# Patient Record
Sex: FEMALE | Race: WHITE | NOT HISPANIC OR LATINO | Employment: OTHER | URBAN - METROPOLITAN AREA
[De-identification: names, ages, dates, MRNs, and addresses within clinical notes are randomized per-mention and may not be internally consistent; named-entity substitution may affect disease eponyms.]

---

## 2017-01-05 ENCOUNTER — HOSPITAL ENCOUNTER (OUTPATIENT)
Facility: HOSPITAL | Age: 77
Discharge: HOME | End: 2017-01-05
Attending: INTERNAL MEDICINE | Admitting: INTERNAL MEDICINE

## 2017-01-05 VITALS
BODY MASS INDEX: 31.33 KG/M2 | DIASTOLIC BLOOD PRESSURE: 52 MMHG | HEART RATE: 62 BPM | HEIGHT: 65 IN | OXYGEN SATURATION: 98 % | WEIGHT: 188.05 LBS | SYSTOLIC BLOOD PRESSURE: 110 MMHG | RESPIRATION RATE: 16 BRPM | TEMPERATURE: 97.5 F

## 2017-01-05 DIAGNOSIS — R07.9 CHEST PAIN, UNSPECIFIED TYPE: ICD-10-CM

## 2017-01-05 PROBLEM — E78.5 HYPERLIPIDEMIA LDL GOAL <70: Status: ACTIVE | Noted: 2017-01-05

## 2017-01-05 LAB
ALBUMIN SERPL-MCNC: 4.3 G/DL (ref 3.2–4.8)
ALBUMIN/GLOB SERPL: 1.3 G/DL (ref 1.5–2.5)
ALP SERPL-CCNC: 42 U/L (ref 25–100)
ALT SERPL W P-5'-P-CCNC: 14 U/L (ref 7–40)
ANION GAP SERPL CALCULATED.3IONS-SCNC: 8 MMOL/L (ref 3–11)
ARTICHOKE IGE QN: 59 MG/DL (ref 0–130)
AST SERPL-CCNC: 22 U/L (ref 0–33)
BILIRUB SERPL-MCNC: 0.4 MG/DL (ref 0.3–1.2)
BUN BLD-MCNC: 28 MG/DL (ref 9–23)
BUN/CREAT SERPL: 21.5 (ref 7–25)
CALCIUM SPEC-SCNC: 10.4 MG/DL (ref 8.7–10.4)
CHLORIDE SERPL-SCNC: 104 MMOL/L (ref 99–109)
CHOLEST SERPL-MCNC: 154 MG/DL (ref 0–200)
CO2 SERPL-SCNC: 24 MMOL/L (ref 20–31)
CREAT BLD-MCNC: 1.3 MG/DL (ref 0.6–1.3)
DEPRECATED RDW RBC AUTO: 48 FL (ref 37–54)
ERYTHROCYTE [DISTWIDTH] IN BLOOD BY AUTOMATED COUNT: 13.9 % (ref 11.3–14.5)
GFR SERPL CREATININE-BSD FRML MDRD: 40 ML/MIN/1.73
GLOBULIN UR ELPH-MCNC: 3.3 GM/DL
GLUCOSE BLD-MCNC: 100 MG/DL (ref 70–100)
GLUCOSE BLDC GLUCOMTR-MCNC: 111 MG/DL (ref 70–130)
GLUCOSE BLDC GLUCOMTR-MCNC: 93 MG/DL (ref 70–130)
HBA1C MFR BLD: 6 % (ref 4.8–5.6)
HCT VFR BLD AUTO: 35.5 % (ref 34.5–44)
HDLC SERPL-MCNC: 69 MG/DL (ref 40–60)
HGB BLD-MCNC: 11.6 G/DL (ref 11.5–15.5)
MCH RBC QN AUTO: 30.9 PG (ref 27–31)
MCHC RBC AUTO-ENTMCNC: 32.7 G/DL (ref 32–36)
MCV RBC AUTO: 94.7 FL (ref 80–99)
PLATELET # BLD AUTO: 144 10*3/MM3 (ref 150–450)
PMV BLD AUTO: 9.7 FL (ref 6–12)
POTASSIUM BLD-SCNC: 4.2 MMOL/L (ref 3.5–5.5)
PROT SERPL-MCNC: 7.6 G/DL (ref 5.7–8.2)
RBC # BLD AUTO: 3.75 10*6/MM3 (ref 3.89–5.14)
SODIUM BLD-SCNC: 136 MMOL/L (ref 132–146)
TRIGL SERPL-MCNC: 83 MG/DL (ref 0–150)
WBC NRBC COR # BLD: 5.82 10*3/MM3 (ref 3.5–10.8)

## 2017-01-05 PROCEDURE — 80061 LIPID PANEL: CPT | Performed by: PHYSICIAN ASSISTANT

## 2017-01-05 PROCEDURE — G0378 HOSPITAL OBSERVATION PER HR: HCPCS

## 2017-01-05 PROCEDURE — C1769 GUIDE WIRE: HCPCS | Performed by: INTERNAL MEDICINE

## 2017-01-05 PROCEDURE — 36415 COLL VENOUS BLD VENIPUNCTURE: CPT

## 2017-01-05 PROCEDURE — C1760 CLOSURE DEV, VASC: HCPCS | Performed by: INTERNAL MEDICINE

## 2017-01-05 PROCEDURE — 83036 HEMOGLOBIN GLYCOSYLATED A1C: CPT | Performed by: PHYSICIAN ASSISTANT

## 2017-01-05 PROCEDURE — C1894 INTRO/SHEATH, NON-LASER: HCPCS | Performed by: INTERNAL MEDICINE

## 2017-01-05 PROCEDURE — 80053 COMPREHEN METABOLIC PANEL: CPT | Performed by: PHYSICIAN ASSISTANT

## 2017-01-05 PROCEDURE — 0 IOPAMIDOL PER 1 ML: Performed by: INTERNAL MEDICINE

## 2017-01-05 PROCEDURE — 93458 L HRT ARTERY/VENTRICLE ANGIO: CPT | Performed by: INTERNAL MEDICINE

## 2017-01-05 PROCEDURE — 82962 GLUCOSE BLOOD TEST: CPT

## 2017-01-05 PROCEDURE — 85027 COMPLETE CBC AUTOMATED: CPT | Performed by: PHYSICIAN ASSISTANT

## 2017-01-05 RX ORDER — ASPIRIN 81 MG/1
81 TABLET ORAL DAILY
COMMUNITY
End: 2020-09-30

## 2017-01-05 RX ORDER — METOCLOPRAMIDE HYDROCHLORIDE 5 MG/ML
10 INJECTION INTRAMUSCULAR; INTRAVENOUS EVERY 6 HOURS PRN
Status: DISCONTINUED | OUTPATIENT
Start: 2017-01-05 | End: 2017-01-05 | Stop reason: HOSPADM

## 2017-01-05 RX ORDER — ASPIRIN 325 MG
325 TABLET ORAL ONCE
Status: COMPLETED | OUTPATIENT
Start: 2017-01-05 | End: 2017-01-05

## 2017-01-05 RX ORDER — CLOPIDOGREL BISULFATE 75 MG/1
75 TABLET ORAL ONCE
Status: DISCONTINUED | OUTPATIENT
Start: 2017-01-05 | End: 2017-01-05 | Stop reason: HOSPADM

## 2017-01-05 RX ORDER — LIDOCAINE HYDROCHLORIDE 10 MG/ML
INJECTION, SOLUTION INFILTRATION; PERINEURAL AS NEEDED
Status: DISCONTINUED | OUTPATIENT
Start: 2017-01-05 | End: 2017-01-05 | Stop reason: HOSPADM

## 2017-01-05 RX ORDER — SODIUM CHLORIDE 9 MG/ML
1-3 INJECTION, SOLUTION INTRAVENOUS CONTINUOUS
Status: DISCONTINUED | OUTPATIENT
Start: 2017-01-05 | End: 2017-01-05 | Stop reason: HOSPADM

## 2017-01-05 RX ORDER — SODIUM CHLORIDE 0.9 % (FLUSH) 0.9 %
1-10 SYRINGE (ML) INJECTION AS NEEDED
Status: DISCONTINUED | OUTPATIENT
Start: 2017-01-05 | End: 2017-01-05 | Stop reason: HOSPADM

## 2017-01-05 RX ORDER — ACETAMINOPHEN 325 MG/1
650 TABLET ORAL EVERY 4 HOURS PRN
Status: DISCONTINUED | OUTPATIENT
Start: 2017-01-05 | End: 2017-01-05 | Stop reason: HOSPADM

## 2017-01-05 RX ORDER — POTASSIUM CHLORIDE 750 MG/1
10 TABLET, FILM COATED, EXTENDED RELEASE ORAL DAILY
COMMUNITY
End: 2018-12-13 | Stop reason: SDUPTHER

## 2017-01-05 RX ORDER — NITROGLYCERIN 0.4 MG/1
0.4 TABLET SUBLINGUAL
Status: DISCONTINUED | OUTPATIENT
Start: 2017-01-05 | End: 2017-01-05 | Stop reason: HOSPADM

## 2017-01-05 RX ORDER — ONDANSETRON 2 MG/ML
4 INJECTION INTRAMUSCULAR; INTRAVENOUS EVERY 6 HOURS PRN
Status: DISCONTINUED | OUTPATIENT
Start: 2017-01-05 | End: 2017-01-05 | Stop reason: HOSPADM

## 2017-01-05 RX ORDER — CLOPIDOGREL BISULFATE 75 MG/1
75 TABLET ORAL DAILY
Status: DISCONTINUED | OUTPATIENT
Start: 2017-01-06 | End: 2017-01-05 | Stop reason: HOSPADM

## 2017-01-05 RX ORDER — SODIUM CHLORIDE 9 MG/ML
100 INJECTION, SOLUTION INTRAVENOUS CONTINUOUS
Status: ACTIVE | OUTPATIENT
Start: 2017-01-05 | End: 2017-01-05

## 2017-01-05 RX ORDER — ASPIRIN 325 MG
325 TABLET, DELAYED RELEASE (ENTERIC COATED) ORAL DAILY
Status: DISCONTINUED | OUTPATIENT
Start: 2017-01-06 | End: 2017-01-05 | Stop reason: HOSPADM

## 2017-01-05 RX ADMIN — ASPIRIN 325 MG ORAL TABLET 325 MG: 325 PILL ORAL at 07:59

## 2017-01-05 RX ADMIN — SODIUM CHLORIDE 100 ML/HR: 9 INJECTION, SOLUTION INTRAVENOUS at 09:16

## 2017-01-05 RX ADMIN — SODIUM CHLORIDE 3 ML/KG/HR: 9 INJECTION, SOLUTION INTRAVENOUS at 08:00

## 2017-01-05 NOTE — IP AVS SNAPSHOT
AFTER VISIT SUMMARY             Lino Guerrero           About your hospitalization     You were admitted on:  January 5, 2017 You last received care in the:  [facility]       Procedures & Surgeries      Procedure(s) (LRB):  Left Heart Cath (N/A)     1/5/2017     Surgeon(s):  Vic Marks MD  -------------------      Medications    If you or your caregiver advised us that you are currently taking a medication and that medication is marked below as “Resume”, this simply indicates that we have reviewed those medications to make sure our new therapy recommendations do not interfere.  If you have concerns about medications other than those new ones which we are prescribing today, please consult the physician who prescribed them (or your primary physician).  Our review of your home medications is not meant to indicate that we are directing their use.             Your Medications      CHANGE how you take these medications     lisinopril 20 MG tablet   Take 1 tablet by mouth Daily.   According to our records, you may have been taking this medication differently.   Commonly known as:  MITZI GOMEZRIL   What changed:  how much to take             CONTINUE taking these medications     amLODIPine 5 MG tablet   Take 1 tablet by mouth Daily.   Commonly known as:  NORVASC           aspirin 81 MG EC tablet   Take 81 mg by mouth Daily.           atorvastatin 20 MG tablet   Take 40 mg by mouth Daily.   Commonly known as:  LIPITOR           BYSTOLIC 5 MG tablet   TAKE 1 TABLET DAILY   Generic drug:  nebivolol           clopidogrel 75 MG tablet   TAKE ONE TABLET BY MOUTH EVERY DAY   Commonly known as:  PLAVIX           DULERA IN   Inhale 1 puff Daily As Needed.           furosemide 20 MG tablet   TAKE 1 TABLET BY MOUTH EVERY MORNING AND 1/2 TABLET AT BEDTIME   Commonly known as:  LASIX           isosorbide mononitrate 30 MG 24 hr tablet   Take 1 tablet by mouth Every Morning.   Commonly known as:  IMDUR           levothyroxine 75 MCG tablet   Take 75 mcg by mouth Daily.   Commonly known as:  SYNTHROID, LEVOTHROID           metFORMIN 500 MG tablet   Take 1,000 mg by mouth 2 (Two) Times a Day With Meals.   Commonly known as:  GLUCOPHAGE           NITROSTAT 0.4 MG SL tablet   place 1 tablet under the tongue every 5 minutes for UP TO 3 doses if needed for angina as directed by prescriber   Generic drug:  nitroglycerin           potassium chloride 10 MEQ CR tablet   Take 10 mEq by mouth Daily.   Commonly known as:  K-DUR           sucralfate 1 G tablet   Take 1 g by mouth 3 (Three) Times a Day.   Commonly known as:  CARAFATE                      Your Medications      Your Medication List           Morning Noon Evening Bedtime As Needed    amLODIPine 5 MG tablet   Take 1 tablet by mouth Daily.   Commonly known as:  NORVASC                                aspirin 81 MG EC tablet   Take 81 mg by mouth Daily.                                atorvastatin 20 MG tablet   Take 40 mg by mouth Daily.   Commonly known as:  LIPITOR                                BYSTOLIC 5 MG tablet   TAKE 1 TABLET DAILY   Generic drug:  nebivolol                                clopidogrel 75 MG tablet   TAKE ONE TABLET BY MOUTH EVERY DAY   Commonly known as:  PLAVIX                                DULERA IN   Inhale 1 puff Daily As Needed.                                furosemide 20 MG tablet   TAKE 1 TABLET BY MOUTH EVERY MORNING AND 1/2 TABLET AT BEDTIME   Commonly known as:  LASIX                                isosorbide mononitrate 30 MG 24 hr tablet   Take 1 tablet by mouth Every Morning.   Commonly known as:  IMDUR                                levothyroxine 75 MCG tablet   Take 75 mcg by mouth Daily.   Commonly known as:  SYNTHROID, LEVOTHROID                                lisinopril 20 MG tablet   Take 1 tablet by mouth Daily.   Commonly known as:  PRINIVIL,ZESTRIL                                metFORMIN 500 MG tablet   Take 1,000 mg by mouth 2  (Two) Times a Day With Meals.   Commonly known as:  GLUCOPHAGE                                NITROSTAT 0.4 MG SL tablet   place 1 tablet under the tongue every 5 minutes for UP TO 3 doses if needed for angina as directed by prescriber   Generic drug:  nitroglycerin                                potassium chloride 10 MEQ CR tablet   Take 10 mEq by mouth Daily.   Commonly known as:  K-DUR                                sucralfate 1 G tablet   Take 1 g by mouth 3 (Three) Times a Day.   Commonly known as:  CARAFATE                                         Instructions for After Discharge        Discharge References/Attachments     ANGINA PECTORIS (ENGLISH)    CORONARY ANGIOGRAM (ENGLISH)    GROIN SURGICAL SITE CARE (ENGLISH)       Follow-ups for After Discharge        Follow-up Information     Follow up with Alexy Blake MD Follow up in 4 week(s).    Specialty:  Cardiology    Contact information:    [facility] [place] [id number] [phone]        VODECLIC Signup     [employer] allows you to send messages to your doctor, view your test results, renew your prescriptions, schedule appointments, and more. To sign up, go to [employer] and click on the Sign Up Now link in the New User? box. Enter your [employer] Activation Code exactly as it appears below along with the last four digits of your Social Security Number and your Date of Birth () to complete the sign-up process. If you do not sign up before the expiration date, you must request a new code.    [employer] Activation Code: [email or web address]  Expires: 2017 12:40 PM    If you have questions, you can email [email or web address] or call [phone] to talk to our VODECL[employer] staff. Remember, [employer] is NOT to be used for urgent needs. For medical emergencies, dial 911.           Summary of Your Hospitalization        Reason for Hospitalization     Your primary diagnosis was:  Coronary Artery Disease Involving  Native Coronary Artery Of Native Heart With Unstable Angina Pectoris    Your diagnoses also included:  Atrial Fibrillation (Irregular Heartbeat), Severe Hypertension, Diabetes Mellitus Type 2, Noninsulin Dependent, Pvd (Peripheral Vascular Disease), Hyperlipidemia Ldl Goal <70, Underactive Thyroid, Chest Pain      Care Providers     Provider Service Role Specialty    Vic Marks MD Cardiology Attending Provider Cardiology      Your Allergies  Date Reviewed: 1/5/2017    No active allergies      Pending Labs     Order Current Status    Hemoglobin A1c In process      Patient Belongings Returned     Document Return of Belongings Flowsheet     Were the patient bedside belongings sent home?   --   Belongings Retrieved from Security & Sent Home   --    Belongings Sent to Safe   --   Medications Retrieved from Pharmacy & Sent Home   --              More Information      Angina Pectoris  Angina pectoris, often called angina, is extreme discomfort in the chest, neck, or arm. This is caused by a lack of blood in the middle and thickest layer of the heart wall (myocardium). There are four types of angina:  · Stable angina. Stable angina usually occurs in episodes of predictable frequency and duration. It is usually brought on by physical activity, stress, or excitement. Stable angina usually lasts a few minutes and can often be relieved by a medicine that you place under your tongue. This medicine is called sublingual nitroglycerin.  · Unstable angina. Unstable angina can occur even when you are doing little or no physical activity. It can even occur while you are sleeping or when you are at rest. It can suddenly increase in severity or frequency. It may not be relieved by sublingual nitroglycerin, and it can last up to 30 minutes.  · Microvascular angina. This type of angina is caused by a disorder of tiny blood vessels called arterioles. Microvascular angina is more common in women. The pain may be more severe and last  longer than other types of angina pectoris.  · Prinzmetal or variant angina. This type of angina pectoris is rare and usually occurs when you are doing little or no physical activity. It especially occurs in the early morning hours.  CAUSES  Atherosclerosis is the cause of angina. This is the buildup of fat and cholesterol (plaque) on the inside of the arteries. Over time, the plaque may narrow or block the artery, and this will lessen blood flow to the heart. Plaque can also become weak and break off within a coronary artery to form a clot and cause a sudden blockage.  RISK FACTORS  Risk factors common to both men and women include:  · High cholesterol levels.  · High blood pressure (hypertension).  · Tobacco use.  · Diabetes.  · Family history of angina.  · Obesity.  · Lack of exercise.  · A diet high in saturated fats.  Women are at greater risk for angina if they are:  · Over age 55.  · Postmenopausal.  SYMPTOMS  Many people do not experience any symptoms during the early stages of angina. As the condition progresses, symptoms common to both men and women may include:  · Chest pain.    The pain can be described as a crushing or squeezing in the chest, or a tightness, pressure, fullness, or heaviness in the chest.    The pain can last more than a few minutes, or it can stop and recur.  · Pain in the arms, neck, jaw, or back.  · Unexplained heartburn or indigestion.  · Shortness of breath.  · Nausea.  · Sudden cold sweats.  · Sudden light-headedness.  Many women have chest discomfort and some of the other symptoms. However, women often have different (atypical) symptoms, such as:   · Fatigue.  · Unexplained feelings of nervousness or anxiety.  · Unexplained weakness.  · Dizziness or fainting.  Sometimes, women may have angina without any symptoms.  DIAGNOSIS   Tests to diagnose angina may include:  · ECG (electrocardiogram).  · Exercise stress test. This looks for signs of blockage when the heart is being  exercised.  · Pharmacologic stress test. This test looks for signs of blockage when the heart is being stressed with a medicine.  · Blood tests.  · Coronary angiogram. This is a procedure to look at the coronary arteries to see if there is any blockage.  TREATMENT   The treatment of angina may include the following:  · Healthy behavioral changes to reduce or control risk factors.  · Medicine.  · Coronary stenting. A stent helps to keep an artery open.  · Coronary angioplasty. This procedure widens a narrowed or blocked artery.  · Coronary artery bypass surgery. This will allow your blood to pass the blockage (bypass) to reach your heart.  HOME CARE INSTRUCTIONS   · Take medicines only as directed by your health care provider.  · Do not take the following medicines unless your health care provider approves:    Nonsteroidal anti-inflammatory drugs (NSAIDs), such as ibuprofen, naproxen, or celecoxib.    Vitamin supplements that contain vitamin A, vitamin E, or both.    Hormone replacement therapy that contains estrogen with or without progestin.  · Manage other health conditions such as hypertension and diabetes as directed by your health care provider.  · Follow a heart-healthy diet. A dietitian can help to educate you about healthy food options and changes.  · Use healthy cooking methods such as roasting, grilling, broiling, baking, poaching, steaming, or stir-frying. Talk to a dietitian to learn more about healthy cooking methods.  · Follow an exercise program approved by your health care provider.  · Maintain a healthy weight. Lose weight as approved by your health care provider.  · Plan rest periods when fatigued.  · Learn to manage stress.  · Do not use any tobacco products, including cigarettes, chewing tobacco, or electronic cigarettes. If you need help quitting, ask your health care provider.  · If you drink alcohol, and your health care provider approves, limit your alcohol intake to no more than 1 drink per  day. One drink equals 12 ounces of beer, 5 ounces of wine, or 1½ ounces of hard liquor.  · Stop illegal drug use.  · Keep all follow-up visits as directed by your health care provider. This is important.  SEEK IMMEDIATE MEDICAL CARE IF:   · You have pain in your chest, neck, arm, jaw, stomach, or back that lasts more than a few minutes, is recurring, or is unrelieved by taking sublingual nitroglycerin.  · You have profuse sweating without cause.  · You have unexplained:    Heartburn or indigestion.    Shortness of breath or difficulty breathing.    Nausea or vomiting.    Fatigue.    Feelings of nervousness or anxiety.    Weakness.    Diarrhea.  · You have sudden light-headedness or dizziness.  · You faint.  These symptoms may represent a serious problem that is an emergency. Do not wait to see if the symptoms will go away. Get medical help right away. Call your local emergency services (911 in the [place] Do not drive yourself to the hospital.     This information is not intended to replace advice given to you by your health care provider. Make sure you discuss any questions you have with your health care provider.     Document Released: 12/18/2006 Document Revised: 01/08/2016 Document Reviewed: 04/21/2015  [employer] Interactive Patient Education ©2016 [employer].          Coronary Angiogram  A coronary angiogram, also called coronary angiography, is an X-ray procedure used to look at the arteries in the heart. In this procedure, a dye (contrast dye) is injected through a long, hollow tube (catheter). The catheter is about the size of a piece of cooked spaghetti and is inserted through your groin, wrist, or arm. The dye is injected into each artery, and X-rays are then taken to show if there is a blockage in the arteries of your heart.  LET YOUR HEALTH CARE PROVIDER KNOW ABOUT:  · Any allergies you have, including allergies to shellfish or contrast dye.    · All medicines you are taking, including vitamins, herbs,  eye drops, creams, and over-the-counter medicines.    · Previous problems you or members of your family have had with the use of anesthetics.    · Any blood disorders you have.    · Previous surgeries you have had.  · History of kidney problems or failure.    · Other medical conditions you have.  RISKS AND COMPLICATIONS   Generally, a coronary angiogram is a safe procedure. However, problems can occur and include:  · Allergic reaction to the dye.  · Bleeding from the access site or other locations.  · Kidney injury, especially in people with impaired kidney function.   · Stroke (rare).  · Heart attack (rare).  BEFORE THE PROCEDURE   · Do not eat or drink anything after midnight the night before the procedure or as directed by your health care provider.    · Ask your health care provider about changing or stopping your regular medicines. This is especially important if you are taking diabetes medicines or blood thinners.  PROCEDURE  · You may be given a medicine to help you relax (sedative) before the procedure. This medicine is given through an intravenous (IV) access tube that is inserted into one of your veins.    · The area where the catheter will be inserted will be washed and shaved. This is usually done in the groin but may be done in the fold of your arm (near your elbow) or in the wrist.     · A medicine will be given to numb the area where the catheter will be inserted (local anesthetic).    · The health care provider will insert the catheter into an artery. The catheter will be guided by using a special type of X-ray (fluoroscopy) of the blood vessel being examined.    · A special dye will then be injected into the catheter, and X-rays will be taken. The dye will help to show where any narrowing or blockages are located in the heart arteries.    AFTER THE PROCEDURE   · If the procedure is done through the leg, you will be kept in bed lying flat for several hours. You will be instructed to not bend or cross  your legs.  · The insertion site will be checked frequently.    · The pulse in your feet or wrist will be checked frequently.    · Additional blood tests, X-rays, and an electrocardiogram may be done.       This information is not intended to replace advice given to you by your health care provider. Make sure you discuss any questions you have with your health care provider.     Document Released: 06/23/2004 Document Revised: 01/08/2016 Document Reviewed: 05/12/2014  [employer] Interactive Patient Education ©2016 [employer]          Groin Surgical Site Care  Refer to this sheet in the next few weeks. These instructions provide you with information about caring for yourself after your procedure. Your health care provider may also give you more specific instructions. Your treatment has been planned according to current medical practices, but problems sometimes occur. Call your health care provider if you have any problems or questions after your procedure.  WHAT TO EXPECT AFTER THE PROCEDURE  After your procedure, it is typical to have the following:  · Bruising at the groin site that usually fades within 1-2 weeks.  · Blood collecting in the tissue (hematoma) that may be painful to the touch. It should usually decrease in size and tenderness within 1-2 weeks.  HOME CARE INSTRUCTIONS  · Take medicines only as directed by your health care provider.  · You may shower 24-48 hours after the procedure or as directed by your health care provider. Remove the bandage (dressing) and gently wash the site with plain soap and water. Pat the area dry with a clean towel. Do not rub the site, because this may cause bleeding.  · Do not take baths, swim, or use a hot tub until your health care provider approves.  · Check your insertion site every day for redness, swelling, or drainage.  · Do not apply powder or lotion to the site.  · Limit use of stairs to twice a day for the first 2-3 days or as directed by your health care  provider.  · Do not squat for the first 2-3 days or as directed by your health care provider.  · Do not lift over 10 lb (4.5 kg) for 5 days after your procedure or as directed by your health care provider.  · Ask your health care provider when it is okay to:    Return to work or school.    Resume usual physical activities or sports.    Resume sexual activity.  · Do not drive home if you are discharged the same day as the procedure. Have someone else drive you.  · You may drive 24 hours after the procedure unless otherwise instructed by your health care provider.  · Do not operate machinery or power tools for 24 hours after the procedure or as directed by your health care provider.  · If your procedure was done as an outpatient procedure, which means that you went home the same day as your procedure, a responsible adult should be with you for the first 24 hours after you arrive home.  · Keep all follow-up visits as directed by your health care provider. This is important.  SEEK MEDICAL CARE IF:  · You have a fever.  · You have chills.  · You have increased bleeding from the groin site. Hold pressure on the site.  SEEK IMMEDIATE MEDICAL CARE IF:  · You have unusual pain at the groin site.  · You have redness, warmth, or swelling at the groin site.  · You have drainage (other than a small amount of blood on the dressing) from the groin site.  · The groin site is bleeding, and the bleeding does not stop after 30 minutes of holding steady pressure on the site.  · Your leg or foot becomes pale, cool, tingly, or numb.     This information is not intended to replace advice given to you by your health care provider. Make sure you discuss any questions you have with your health care provider.     Document Released: 08/21/2015 Document Reviewed: 08/21/2015  [employer] Interactive Patient Education ©2016 [employer]            SYMPTOMS OF A STROKE    Call 911 or have someone take you to the Emergency Department if you have any  of the following:    · Sudden numbness or weakness of your face, arm or leg especially on one side of the body  · Sudden confusion, diffiiculty speaking or trouble understanding   · Changes in your vision or loss of sight in one eye  · Sudden severe headache with no known cause  · sudden dizziness, trouble walking, loss of balance or coordination    It is important to seek emergency care right away if you have further stroke symptoms. If you get emergency help quickly, the powerful clot-dissolving medicines can reduce the disabilities caused by a stroke.     For more information:    American Stroke Association  9-173-3-STROKE  www.strokeassociation.org           IF YOU SMOKE OR USE TOBACCO PLEASE READ THE FOLLOWING:    Why is smoking bad for me?  Smoking increases the risk of heart disease, lung disease, vascular disease, stroke, and cancer.     If you smoke, STOP!    If you would like more information on quitting smoking, please visit the [employer] website: [email or web address]   This link will provide additional resources including the QUIT line and the Beat the Pack support groups.     For more information:    American Cancer Society  (435) 580-6391    American Heart Association  1-413.826.9526               YOU ARE THE MOST IMPORTANT FACTOR IN YOUR RECOVERY.     Follow all instructions carefully.     I have reviewed my discharge instructions with my nurse, including the following information, if applicable:     Information about my illness and diagnosis   Follow up appointments (including lab draws)   Wound Care   Equipment Needs   Medications (new and continuing) along with side effects   Preventative information such as vaccines and smoking cessations   Diet   Pain   I know when to contact my Doctor's office or seek emergency care      I want my nurse to describe the side effects of my medications: YES NO   If the answer is no, I understand the side effects of  my medications: YES NO   My nurse described the side effects of my medications in a way that I could understand: YES NO   I have taken my personal belongings and my own medications with me at discharge: YES NO            I have received this information and my questions have been answered. I have discussed any concerns I see with this plan with the nurse or physician. I understand these instructions.    Signature of Patient or Responsible Person: _____________________________________    Date: _________________  Time: __________________    Signature of Healthcare Provider: _______________________________________  Date: _________________  Time: __________________

## 2017-01-05 NOTE — PLAN OF CARE
Problem: Patient Care Overview (Adult)  Goal: Plan of Care Review  Outcome: Ongoing (interventions implemented as appropriate)    01/05/17 1333   Coping/Psychosocial Response Interventions   Plan Of Care Reviewed With patient;family   Patient Care Overview   Progress improving   Outcome Evaluation   Outcome Summary/Follow up Plan PT AND FAMILY ABLE TO VERBALIZE UNDERSTANDING OF DC INSTRUCTIONS- NO QUESTIONS AT THIS TIME- SITE STABLE AND AMBULATING WITH NO ISSUES.       Goal: Discharge Needs Assessment  Outcome: Ongoing (interventions implemented as appropriate)    01/05/17 1333   Discharge Needs Assessment   Concerns To Be Addressed no discharge needs identified         Problem: Cardiac Catheterization with/without PCI (Adult)  Goal: Signs and Symptoms of Listed Potential Problems Will be Absent or Manageable (Cardiac Catheterization with/without PCI)  Outcome: Ongoing (interventions implemented as appropriate)    01/05/17 1333   Cardiac Catheterization with/without PCI   Problems Assessed (Cardiac Catheterization) all   Problems Present (Cardiac Catheterization) none

## 2017-01-05 NOTE — INTERVAL H&P NOTE
H&P reviewed. The patient was examined and there are no changes to the H&P.    Mrs. Huynh is a patient of our colleague Dr Alexy Blake who has known non obstructive coronary artery disease of 50% in the 1st diagonal as well as the LAD by catheterization in 2014 and is being referred for a cardiac catheterization due to ongoing chest pain, dyspnea and dizziness as reported at her last appointment with him 2 weeks ago. She was started on Imdur 30 mg at that time but is still experiencing progressive exertional angina of which she has increased her use of SL NTG that does seem to help.  She is currently chest pain free. She has uncontrolled hypertension and at her last visit was started on amlodipine and her dose of lisinopril was increased and this seems to have improved her numbers.    Physical Exam:  Constitutional: AAO x 3, no acute distress  HEENT: No JVD, no carotid bruit noted  Lungs: CTA bilat, respirations even and unlabored  Cardiac: Normal S 1 and S 2, RRR, no murmurs, rubs or gallops  Abdomen: soft non tender, BS + x 4, no hepatosplenomegaly  Skin: Warm, pink and dry  Extremities: Pulses palpable bilat, normal left andres's  Psych: normal mood and affect  Musculoskeletal: no bony abnormalities    Lab Results   Component Value Date    BUN 28 (H) 01/05/2017     Lab Results   Component Value Date    CREATININE 1.30 01/05/2017     Lab Results   Component Value Date     01/05/2017    K 4.2 01/05/2017     01/05/2017    CO2 24.0 01/05/2017     Lab Results   Component Value Date    WBC 5.82 01/05/2017    HGB 11.6 01/05/2017    HCT 35.5 01/05/2017    MCV 94.7 01/05/2017     (L) 01/05/2017     Hospital Problem List     * (Principal)Coronary artery disease involving native coronary artery of native heart with unstable angina pectoris    Overview Addendum 1/5/2017  8:00 AM by SAMUEL Prado     · Remote borderline abnormal IV Adenosine Quantitative SPECT Thallium 201 study, April 1997.  Remote progressive CCS class III-IV chest pain syndrome with       mild nonobstructive coronary atherosclerosis and mild reduction in LV function (LVEF 0.52), May 2001,   · Acceptable combination Doppler echocardiogram with mild concentric LVH, LVEF (0.55), March 2006.   · Recent prolonged chest pain syndrome with observational stay ([facility]), June 2011, with subsequent diagnostic coronary angiography                           moderate branch vessel CAD with acceptable FFR. Normal LV function, LVEF (0.60),continued medical therapy felt warranted, July 2011.    · Residual CCS class I angina pectoris with recent progressive atypical chest pain syndrome/normal EKG, August 2012, January 2013, May 2013.    · Abnormal PET cardiac scan demonstrating small area of reversible LAD ischemia with prominent diffuse ischemic ST-T changes and acceptable preserved            systolic function,  06/04/2013, with patient refusing further intervention with diagnostic coronary angiography 06/26/2013.  · Echocardiogram showing an estimated EF of 45% to 50% with moderate tricuspid regurgitation and mild concentric left ventricular hypertrophy, 10/17/2014.   · NSTEMI with left heart catheterization showing a nonobstructive single vessel coronary artery disease 50% ostial stenosis of the first diagonal branch, LAD. No       hemodynamically significant CAD and estimated EF of 35% with features of Takotsubo cardiomyopathy, 10/15/2014, by Dr. Marks.   · Residual class I symptoms with acceptable echocardiogram, February 2016.         Severe hypertension    Overview Addendum 1/5/2017  8:02 AM by SAMUEL Prado     · Acceptable renal nuclear blood flow scan/renal ultrasound/abnormal acceptable abdominal CT scan, April 1997.   · Recurrent progressive hypertensive blood pressure readings/acceptable selective bilateral renal arteriography, May 2011.    · Started on amlodipine and lisinopril increased office visit 12/23/2016.          PAF (paroxysmal atrial fibrillation)    Overview Addendum 1/5/2017  7:55 AM by SAMUEL Prado     · Intermittent paroxysmal atrial fibrillation, (12/2009), with subsequent chronic oral anticoagulation x1 year.   · Juliocesar Vasc=6          Diabetes mellitus type 2, noninsulin dependent    Overview Signed 12/8/2016  4:00 PM by Adali Thomas      (hemoglobin A1c 5.8%), July 2011         Hyperlipidemia LDL goal <70    PVD (peripheral vascular disease)    Overview Addendum 1/5/2017  7:55 AM by SAMUEL Prado     · Asymptomatic bilateral carotid bruits with remote acceptable carotid duplex studies, April 1997.  · Recent abnormal carotid duplex study with moderate right internal carotid artery stenosis (40% to 60%) with mild left internal carotid artery stenosis (20% to 40%). 2011.    · Abnormal carotid duplex study with bilateral moderate carotid artery atherosclerotic plaque/stenoses, January 2015, without focal motor-sensory changes.             Hypothyroidism    Overview Signed 12/8/2016  4:04 PM by Adali Thomas     chronic, /replacement therapy:               Plan:    · Undergo Cardiac catheterization with Dr Marks via left radial approach: Risks and benefits were explained and patient is agreeable to proceed.  · Give full strength Asprin as pre op  · Hold metformin for 48 hrs after procedure  · More recommendations to follow post procedure    Loly BAKER    I have seen and examined the patient, case was discussed with the physician extender, reviewed the above note, necessary changes were made and I agree with the final note.   Vic Marks MD, [facility], [facility]

## 2017-01-05 NOTE — Clinical Note
Hemostasis started on the Artery.  Angio-Seal was used in achieving hemostasis.  Closure device deployed in the vessel. Hemostasis achieved successfully. .

## 2017-01-05 NOTE — H&P (VIEW-ONLY)
Subjective:     Encounter Date:12/23/2016      Patient ID: Lino Guerrero is a 76 y.o. 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    PHYSICIAN: James Mcfarlane DO   GASTROENTEROLOGIST: Tanner Rdz MD   ENDOCRINOLOGIST: Antonio Barrera MD   INTERVENTIONAL CARDIOLOGIST:  Vic Marks MD, [facility]    Chief Complaint:   Chief Complaint   Patient presents with   • Follow-up     htn, paf, pvd     Problem List:  1. Remote progressive severe hypertension, 1997:  a. Acceptable renal nuclear blood flow scan/renal ultrasound/abnormal acceptable abdominal CT scan, April 1997.   b. Recurrent progressive hypertensive blood pressure readings/acceptable selective bilateral renal arteriography, May 2011.   2. Intermittent paroxysmal atrial fibrillation, December 2009, with subsequent chronic oral anticoagulation x1 year.   3. Noninsulin dependent type 2 diabetes mellitus (hemoglobin A1c 5.8%), July 2011.   4. Remote hiatal hernia/probable GERD syndrome with remote EGD, April 1997.   5. Remote peptic ulcer disease with recurrent intermittent gastritis.   6. Osteoarthritis, predominantly involving the knees.   7. Peripheral vascular disease:  a. Asymptomatic bilateral carotid bruits with remote acceptable carotid duplex studies, April 1997.  b. Recent abnormal carotid duplex study with moderate right internal carotid artery stenosis (40% to 60%) with mild left internal carotid artery stenosis (20% to 40%), July 2011.   c. Abnormal carotid duplex study with bilateral moderate carotid artery atherosclerotic plaque/stenoses, January 2015, without focal motor-sensory changes.   8. Remote additional operations:  a. Bilateral benign breast cyst removal, 1985.   b. 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   c. Remote colon polypectomy with incidental findings of diverticulosis, August 1995.   9. Chronic hypothyroidism/replacement therapy:   10. Chronic chest pain syndrome with combined hypertensive and ischemic  cardiovascular disease:  a. Intermittent exertional dyspnea/chest pain syndrome/probable hypertensive cardiovascular disease:  b. Remote borderline abnormal IV Adenosine Quantitative SPECT Thallium 201 study, April 1997. Remote progressive CCS class III-IV chest pain syndrome with mild nonobstructive coronary atherosclerosis and mild reduction in LV function (LVEF 0.52), May 2001, with subsequent abnormal EGD demonstrating gastritis/treatment with resolution of symptoms.   c. Acceptable combination Doppler echocardiogram with mild concentric LVH, LVEF (0.55), March 2006.   d. Recent prolonged chest pain syndrome with observational stay ([facility]), June 2011, with subsequent diagnostic coronary angiography demonstrating moderate branch vessel coronary artery disease with acceptable flow wire assessment and nominal LV function, LVEF (0.60) with continued medical therapy felt warranted, July 2011.   e. Probable residual CCS class I angina pectoris with recent progressive atypical chest pain syndrome/normal EKG, August 2012, January 2013, May 2013.   f. Abnormal hybrid PET cardiac scan demonstrating small area of reversible LAD ischemia with prominent diffuse ischemic ST-T changes and acceptable preserved systolic function, 06/04/2013, with patient refusing further intervention with diagnostic coronary angiography 06/26/2013.  g. Echocardiogram showing an estimated EF of 45% to 50% with moderate tricuspid regurgitation and mild concentric left ventricular hypertrophy, 10/17/2014.   h. Non-ST elevation MI with left heart catheterization showing a nonobstructive single vessel coronary artery disease 50% ostial stenosis of the first diagonal branch, left anterior descending coronary artery, otherwise, no hemodynamically significant coronary artery disease and estimated EF of 35% with features of Takotsubo cardiomyopathy, 10/15/2014, by Dr. Marks.   i. Residual class I symptoms with acceptable echocardiogram,  "February 2016.  j. CCS class 2 chest pain/NYHA class II dyspnea December 2016  11. Intermittent tinnitus, AD, recurrent, October 2013.   12. Remote abnormal uterine bleeding with apparent abnormal uterine biopsy with apparent uterine cancer, stage and type unknown-data deficit, summer 2005, with subsequent SERG/BSO with apparent negative lymph node sampling for well differentiated endometrial carcinoma, September 2005.   13. Chronic tobacco use with abnormal chest x-ray with recently discontinued tobacco use, autumn 2011.   14. GERD/gastritis:  a. Abnormal EGD, May 2013.   b. Initiation of Dexilant therapy, January 2013, with continuation after EGD, May 2013    No Known Allergies      Current Outpatient Prescriptions:   •  BYSTOLIC 5 MG tablet, TAKE 1 TABLET DAILY, Disp: 90 tablet, Rfl: 2  •  clopidogrel (PLAVIX) 75 MG tablet, TAKE ONE TABLET BY MOUTH EVERY DAY, Disp: 90 tablet, Rfl: 2  •  furosemide (LASIX) 20 MG tablet, TAKE 1 TABLET BY MOUTH EVERY MORNING AND 1/2 TABLET AT BEDTIME, Disp: 135 tablet, Rfl: 1  •  lisinopril (PRINIVIL,ZESTRIL) 20 MG tablet, TAKE 1/2 TABLET DAILY., Disp: 45 tablet, Rfl: 2  •  NITROSTAT 0.4 MG SL tablet, place 1 tablet under the tongue every 5 minutes for UP TO 3 doses if needed for angina as directed by prescriber, Disp: 25 tablet, Rfl: 2    History of Present Illness  Patient continues to have chest pain associated with shortness of breath and dizziness.  She states that she takes 1-2 nitroglycerin tablets at a time and the past few days she has had several episodes of having to  use nitroglycerin.  She describes the chest pain as both sharp and dull and occurring with increasingly less activity with occasional radiation into the left lateral neck, as well as her left medial arm.  She denies any presyncope, syncope, palpitations, or edema.  She does not check her blood pressure at home and states that she went to her physician yesterday and her blood pressure was in the \"140s.\"    ROS " "  Obtained and otherwise negative except as outlined in problem list and HPI.      ECG 12 Lead  Date/Time: 12/23/2016 12:34 PM  Performed by: MICHEAL HENAO  Authorized by: MICHEAL HENAO   Rhythm: sinus bradycardia  Rhythm comments: Sinus bradycardia, 58 bpm  Clinical impression: normal ECG               Objective:       Vitals:    12/23/16 1206 12/23/16 1209   BP: 172/75 168/66   BP Location: Left arm Left arm   Patient Position: Sitting Standing   Pulse: 65 60   Weight: 189 lb (85.7 kg)    Height: 66\" (167.6 cm)    Recheck blood pressure 160/60 left arm sitting  Body mass index is 30.51 kg/(m^2).   Last weight 183 pounds    Physical Exam   Constitutional: She appears well-developed and well-nourished.   HENT:   Head: Normocephalic and atraumatic.   Mouth/Throat: Oropharynx is clear and moist.   Neck: Neck supple. No JVD present. Carotid bruit is not present. No thyromegaly present.   Cardiovascular: Normal rate.  An irregular rhythm present.  Occasional extrasystoles are present. Exam reveals no gallop, no S3 and no friction rub.    Murmur heard.   Medium-pitched early systolic murmur is present with a grade of 2/6  radiating to the neck Right carotid bruit greater than left carotid bruit  Pulses:       Dorsalis pedis pulses are 2+ on the right side, and 2+ on the left side.        Posterior tibial pulses are 2+ on the right side, and 2+ on the left side.   Pulmonary/Chest: Effort normal and breath sounds normal.   Abdominal: Soft. She exhibits no mass. There is no hepatosplenomegaly. There is no tenderness.   Lymphadenopathy:     She has no cervical adenopathy.   Neurological: She is alert.   Skin: Skin is warm, dry and intact.       Lab Review:   Lab Results   Component Value Date    GLUCOSE 122 (H) 10/17/2014    BUN 19 10/17/2014    CREATININE 1.1 10/17/2014    CO2 26 10/17/2014    CALCIUM 8.9 10/17/2014       Lab Results   Component Value Date    WBC 6.46 10/18/2014    HGB 10.3 (L) 10/18/2014    HCT 31.0 " (L) 10/18/2014    MCV 86.1 10/18/2014     (L) 10/18/2014       Lab Results   Component Value Date    HGBA1C 5.8 10/16/2014       Lab Results   Component Value Date    TSH 1.078 10/16/2014       Lab Results   Component Value Date    TRIG 54 10/16/2014     Lab Results   Component Value Date    HDL 54 10/16/2014         Assessment:     Patient continues to have severe uncontrolled  hypertension, and we will begin amlodipine 5 mg daily, and increase her lisinopril to 20 mg daily. She is also having continued chest pain associated with shortness of breath and dizziness, and we will schedule her for a left heart catheterization +/- catheter based intervention. We will also begin her on 30 mg of Imdur daily for recent progressive chest pain with intermittent rest features suggestive of unstable angina pectoris.  The procedure risks and potential complications of diagnostic coronary angiography have been discussed with the patient, and she is in agreement to proceed.     Diagnosis Plan   1. Severe hypertension     2. PAF (paroxysmal atrial fibrillation)     3. PVD (peripheral vascular disease)     4. Chest pain syndrome     5. Diabetes mellitus type 2, noninsulin dependent            Plan:         1. Patient to continue current medications and close follow up with the above providers.  2. Tentative cardiology follow up in April 2017, or patient may return sooner PRN.  Increase lisinopril to 20 mg  Amlodipine 5 mg daily  Imdur 30 mg daily  LHC +/- CBI    Scribed for Alexy Blake MD by Charo Gould, SAMUEL. 12/23/2016  12:11 PM    IAlexy MD, [facility], personally performed the services described in this documentation as scribed by the above named individual in my presence, and it is both accurate and complete. At 12:53 PM on 12/23/2016

## 2017-01-20 RX ORDER — ATORVASTATIN CALCIUM 40 MG/1
TABLET, FILM COATED ORAL
Qty: 90 TABLET | Refills: 2 | Status: SHIPPED | OUTPATIENT
Start: 2017-01-20 | End: 2018-12-13 | Stop reason: SDUPTHER

## 2017-04-17 ENCOUNTER — TELEPHONE (OUTPATIENT)
Dept: CARDIOLOGY | Facility: CLINIC | Age: 77
End: 2017-04-17

## 2017-04-17 DIAGNOSIS — Z00.00 HEALTH CARE MAINTENANCE: Primary | ICD-10-CM

## 2017-04-17 RX ORDER — BUMETANIDE 1 MG/1
1 TABLET ORAL 2 TIMES DAILY
Qty: 180 TABLET | Refills: 3 | Status: SHIPPED | OUTPATIENT
Start: 2017-04-17 | End: 2018-07-06 | Stop reason: SDUPTHER

## 2017-04-17 NOTE — TELEPHONE ENCOUNTER
Patient called in regards to swelling in the feet. Patient states her fluid pill is no longer working. BP was 120/50 last week at her PCP and patient does not have a monitor at home. Weight was 194lb last week and patient lost 4 pounds since last month. Patient states she has some wheezing but is no longer taking her inhalers. Patient has no other symptoms to report. Please advise.

## 2017-05-01 RX ORDER — CLOPIDOGREL BISULFATE 75 MG/1
TABLET ORAL
Qty: 90 TABLET | Refills: 2 | Status: SHIPPED | OUTPATIENT
Start: 2017-05-01 | End: 2018-01-17 | Stop reason: SDUPTHER

## 2017-06-15 DIAGNOSIS — Z00.00 HEALTH CARE MAINTENANCE: ICD-10-CM

## 2017-08-18 ENCOUNTER — OFFICE VISIT (OUTPATIENT)
Dept: CARDIOLOGY | Facility: CLINIC | Age: 77
End: 2017-08-18

## 2017-08-18 VITALS
SYSTOLIC BLOOD PRESSURE: 144 MMHG | HEART RATE: 58 BPM | WEIGHT: 189.2 LBS | BODY MASS INDEX: 30.41 KG/M2 | DIASTOLIC BLOOD PRESSURE: 56 MMHG | HEIGHT: 66 IN

## 2017-08-18 DIAGNOSIS — I25.110 CORONARY ARTERY DISEASE INVOLVING NATIVE CORONARY ARTERY OF NATIVE HEART WITH UNSTABLE ANGINA PECTORIS (HCC): Primary | ICD-10-CM

## 2017-08-18 DIAGNOSIS — E78.5 HYPERLIPIDEMIA LDL GOAL <70: ICD-10-CM

## 2017-08-18 DIAGNOSIS — I10 SEVERE HYPERTENSION: ICD-10-CM

## 2017-08-18 PROCEDURE — 99213 OFFICE O/P EST LOW 20 MIN: CPT | Performed by: INTERNAL MEDICINE

## 2017-08-18 RX ORDER — DEXLANSOPRAZOLE 60 MG/1
60 CAPSULE, DELAYED RELEASE ORAL DAILY
COMMUNITY
End: 2019-10-25

## 2017-08-18 NOTE — PROGRESS NOTES
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    PHYSICIAN: James Mcfarlane DO   GASTROENTEROLOGIST: Tanner Rdz MD   ENDOCRINOLOGIST: Antonio Barrera MD   INTERVENTIONAL CARDIOLOGIST:  Vic Marks MD, [facility], [facility]    Chief Complaint:   Chief Complaint   Patient presents with   • Hypertension     follow up     Problem List:  1. Remote progressive severe hypertension, 1997:  a. Acceptable renal nuclear blood flow scan/renal ultrasound/abnormal acceptable abdominal CT scan, April 1997.   b. Recurrent progressive hypertensive blood pressure readings/acceptable selective bilateral renal arteriography, May 2011.   c. Acceptable blood pressure control.  2. Intermittent paroxysmal atrial fibrillation, December 2009, with subsequent chronic oral anticoagulation x1 year.   3. Noninsulin dependent type 2 diabetes mellitus (hemoglobin A1c 5.8%), July 2011.   4. Remote hiatal hernia/probable GERD syndrome with remote EGD, April 1997.   5. Remote peptic ulcer disease with recurrent intermittent gastritis.   6. Osteoarthritis, predominantly involving the knees.   7. Peripheral vascular disease:  a. Asymptomatic bilateral carotid bruits with remote acceptable carotid duplex studies, April 1997.  b. Remote abnormal carotid duplex study with moderate right internal carotid artery stenosis (40% to 60%) with mild left internal carotid artery stenosis (20% to 40%), July 2011.   c. Abnormal carotid duplex study with bilateral moderate carotid artery atherosclerotic plaque/stenoses, January 2015, without focal motor-sensory changes.   8. Remote additional operations:  a. Bilateral benign breast cyst removal, 1985.   b. 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   c. Remote colon polypectomy with incidental findings of diverticulosis, August 1995.   9. Chronic hypothyroidism/replacement therapy:   10. 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 Intermittent exertional dyspnea/chest pain syndrome/probable hypertensive cardiovascular disease:  b. Remote borderline abnormal IV Adenosine Quantitative SPECT Thallium 201 study, April 1997. Remote progressive CCS class III-IV chest pain syndrome with mild nonobstructive coronary atherosclerosis and mild reduction in LV function (LVEF 0.52), May 2001, with subsequent abnormal EGD demonstrating gastritis/treatment with resolution of symptoms.   c. Acceptable combination Doppler echocardiogram with mild concentric LVH, LVEF (0.55), March 2006.   d. Remote prolonged chest pain syndrome with observational stay ([facility]), June 2011, with subsequent diagnostic coronary angiography demonstrating moderate branch vessel coronary artery disease with acceptable flow wire assessment and nominal LV function, LVEF (0.60) with continued medical therapy felt warranted, July 2011.   e. Probable residual CCS class I angina pectoris with recent progressive atypical chest pain syndrome/normal EKG, August 2012, January 2013, May 2013.   f. Abnormal hybrid PET cardiac scan demonstrating small area of reversible LAD ischemia with prominent diffuse ischemic ST-T changes and acceptable preserved systolic function, 06/04/2013, with patient refusing further intervention with diagnostic coronary angiography 06/26/2013.  g. Echocardiogram showing an estimated EF of 45% to 50% with moderate tricuspid regurgitation and mild concentric left ventricular hypertrophy, 10/17/2014.   h. Non-ST elevation MI with left heart catheterization showing a nonobstructive single vessel coronary artery disease 50% ostial stenosis of the first diagonal branch, left anterior descending coronary artery, otherwise, no hemodynamically significant coronary artery disease and estimated EF of 35% with features of Takotsubo cardiomyopathy, 10/15/2014, by Dr. Marks.   i. Residual class I symptoms  with acceptable echocardiogram, February 2016.  j. CCS class 2 chest pain/NYHA class II dyspnea December 2016  k. Left heart catheterization 1/5/17; no evidence of hematoma and dynamically significant coronary artery disease, mild nonobstructive plaque in the LAD and first diagonal noted, LVEF 0.55, arterial hypertension, otherwise normal hemodynamics, medical therapy and risk factor management recommended  l. Residual Class I symptoms  11. Intermittent tinnitus, AD, recurrent, October 2013.   12. Remote abnormal uterine bleeding with apparent abnormal uterine biopsy with apparent uterine cancer, stage and type unknown-data deficit, summer 2005, with subsequent SERG/BSO with apparent negative lymph node sampling for well differentiated endometrial carcinoma, September 2005.   13. Chronic tobacco use with abnormal chest x-ray with recently discontinued tobacco use, autumn 2011.   14. GERD/gastritis:  a. Abnormal EGD, May 2013.   b. Initiation of Dexilant therapy, January 2013, with continuation after EGD, May 2013  No Known Allergies      Current Outpatient Prescriptions:   •  amLODIPine (NORVASC) 5 MG tablet, Take 1 tablet by mouth Daily., Disp: 90 tablet, Rfl: 3  •  aspirin 81 MG EC tablet, Take 81 mg by mouth Daily., Disp: , Rfl:   •  atorvastatin (LIPITOR) 40 MG tablet, TAKE 1 TABLET DAILY, Disp: 90 tablet, Rfl: 2  •  bumetanide (BUMEX) 1 MG tablet, Take 1 tablet by mouth 2 (Two) Times a Day., Disp: 180 tablet, Rfl: 3  •  BYSTOLIC 5 MG tablet, TAKE 1 TABLET DAILY, Disp: 90 tablet, Rfl: 2  •  clopidogrel (PLAVIX) 75 MG tablet, take 1 tablet by mouth once daily, Disp: 90 tablet, Rfl: 2  •  dexlansoprazole (DEXILANT) 60 MG capsule, Take 60 mg by mouth Daily., Disp: , Rfl:   •  isosorbide mononitrate (IMDUR) 30 MG 24 hr tablet, Take 1 tablet by mouth Every Morning., Disp: 90 tablet, Rfl: 3  •  levothyroxine (SYNTHROID, LEVOTHROID) 75 MCG tablet, Take 75 mcg by mouth Daily., Disp: , Rfl:   •  lisinopril  (PRINIVIL,ZESTRIL) 20 MG tablet, Take 1 tablet by mouth Daily. (Patient taking differently: Take 10 mg by mouth Daily.), Disp: 90 tablet, Rfl: 3  •  metFORMIN (GLUCOPHAGE) 500 MG tablet, Take 1,000 mg by mouth 2 (Two) Times a Day With Meals., Disp: , Rfl:   •  Mometasone Furo-Formoterol Fum (DULERA IN), Inhale 1 puff Daily As Needed., Disp: , Rfl:   •  NITROSTAT 0.4 MG SL tablet, place 1 tablet under the tongue every 5 minutes for UP TO 3 doses if needed for angina as directed by prescriber, Disp: 25 tablet, Rfl: 2  •  potassium chloride (K-DUR) 10 MEQ CR tablet, Take 10 mEq by mouth Daily., Disp: , Rfl:   •  sucralfate (CARAFATE) 1 G tablet, Take 1 g by mouth 3 (Three) Times a Day., Disp: , Rfl:     History of Present Illness  Patient is here for a 7 month follow-up.  She denies any chest pain, shortness of breath, presyncope, syncope, edema, or palpitations.  Her right ankle is a little bit swollen today, but when she props up her feet, this is alleviated.  Her right hip has been bothering her, but she has not seen an orthopedic surgeon about it yet.  Patient otherwise denies chest pain, shortness of breath, PND, palpitations, syncope or presyncope at this time.  No tobacco or NTG use.  She is unaware of her laboratory results, as are we.      Review of Systems   Cardiovascular: Positive for leg swelling.   Respiratory: Positive for snoring and wheezing.    Hematologic/Lymphatic: Bruises/bleeds easily.   Musculoskeletal: Positive for arthritis, back pain, joint pain, joint swelling, muscle cramps, muscle weakness, myalgias, neck pain and stiffness.   Gastrointestinal: Positive for bloating and flatus.   Genitourinary: Positive for bladder incontinence.   Neurological: Positive for loss of balance and numbness.   Allergic/Immunologic: Positive for environmental allergies.      Obtained and otherwise negative except as outlined in problem list and HPI.    Procedures       Objective:       Vitals:    08/18/17 1257  "08/18/17 1258   BP: 131/57 144/56   BP Location: Right arm Left arm   Patient Position: Sitting Sitting   Pulse: 60 58   Weight: 189 lb 3.2 oz (85.8 kg)    Height: 66\" (167.6 cm)      Body mass index is 30.54 kg/(m^2).   Last weight:  189 lbs.    Physical Exam   Constitutional: She is oriented to person, place, and time. She appears well-developed and well-nourished.   Neck: No JVD present. Carotid bruit is not present. No thyromegaly present.   Cardiovascular: Regular rhythm, S1 normal and S2 normal.  Exam reveals no gallop, no S3 and no friction rub.    Murmur heard.   Medium-pitched harsh early systolic murmur is present with a grade of 2/6  at the upper right sternal border radiating to the neck  Pulses:       Dorsalis pedis pulses are 2+ on the right side, and 2+ on the left side.        Posterior tibial pulses are 2+ on the right side, and 2+ on the left side.   Pulmonary/Chest: Effort normal and breath sounds normal. She has no wheezes. She has no rhonchi. She has no rales.   Abdominal: Soft. She exhibits no mass. There is no hepatosplenomegaly. There is no tenderness. There is no guarding.   Musculoskeletal: She exhibits edema (2+ right ankle without right calf tenderness or ecchymosis).   Lymphadenopathy:     She has no cervical adenopathy.   Neurological: She is alert and oriented to person, place, and time.   Skin: Skin is warm, dry and intact. No rash noted.   Vitals reviewed.      Lab Review:   Lab Results   Component Value Date    GLUCOSE 100 01/05/2017    BUN 28 (H) 01/05/2017    CREATININE 1.30 01/05/2017    EGFRIFNONA 40 (L) 01/05/2017    BCR 21.5 01/05/2017    CO2 24.0 01/05/2017    CALCIUM 10.4 01/05/2017    ALBUMIN 4.30 01/05/2017    LABIL2 1.3 (L) 01/05/2017    AST 22 01/05/2017    ALT 14 01/05/2017       Lab Results   Component Value Date    WBC 5.82 01/05/2017    HGB 11.6 01/05/2017    HCT 35.5 01/05/2017    MCV 94.7 01/05/2017     (L) 01/05/2017       Lab Results   Component Value Date "    HGBA1C 6.00 (H) 01/05/2017       Lab Results   Component Value Date    TSH 1.078 10/16/2014       Lab Results   Component Value Date    CHOL 154 01/05/2017     Lab Results   Component Value Date    TRIG 83 01/05/2017    TRIG 54 10/16/2014     Lab Results   Component Value Date    HDL 69 (H) 01/05/2017    HDL 54 10/16/2014   LDL - 59 (01/05/2017)      Assessment:   Overall continued acceptable course with no interim cardiopulmonary complaints with acceptable functional status. We will defer additional diagnostic or therapeutic intervention from a cardiac perspective at this time. We encouraged the patient to share her laboratory values with us when they are available next time she has some drawn.  We suggested that the patient see an orthopedist about her hip pain.       Diagnosis Plan   1. Coronary artery disease involving native coronary artery of native heart with unstable angina pectoris  Stable, [facility] in January demonstrated nonobstructive coronary artery disease    2. Severe hypertension  Controlled    3. Hyperlipidemia LDL goal <70  No new labs to review           Plan:         1. Patient to continue current medications and close follow up with the above providers.  2. Tentative cardiology follow up in April 2018 or patient may return sooner PRN.       Scribed for Alexy Blake MD by Charo Gould, SAMUEL. 8/18/2017  1:12 PM    I, Alexy Blake MD, [facility], personally performed the services described in this documentation as scribed by the above named individual in my presence, and it is both accurate and complete. At 2:26 PM on 08/18/2017

## 2017-09-08 RX ORDER — NEBIVOLOL HYDROCHLORIDE 5 MG/1
TABLET ORAL
Qty: 90 TABLET | Refills: 2 | Status: SHIPPED | OUTPATIENT
Start: 2017-09-08 | End: 2018-05-16 | Stop reason: SDUPTHER

## 2018-01-18 RX ORDER — CLOPIDOGREL BISULFATE 75 MG/1
TABLET ORAL
Qty: 90 TABLET | Refills: 2 | Status: SHIPPED | OUTPATIENT
Start: 2018-01-18 | End: 2018-11-19 | Stop reason: SDUPTHER

## 2018-01-18 RX ORDER — AMLODIPINE BESYLATE 5 MG/1
TABLET ORAL
Qty: 90 TABLET | Refills: 3 | Status: SHIPPED | OUTPATIENT
Start: 2018-01-18 | End: 2018-12-13 | Stop reason: SDUPTHER

## 2018-01-18 RX ORDER — ISOSORBIDE MONONITRATE 30 MG/1
TABLET, EXTENDED RELEASE ORAL
Qty: 90 TABLET | Refills: 3 | Status: SHIPPED | OUTPATIENT
Start: 2018-01-18 | End: 2018-04-20 | Stop reason: SDUPTHER

## 2018-02-22 RX ORDER — LISINOPRIL 20 MG/1
TABLET ORAL
Qty: 90 TABLET | Refills: 1 | Status: SHIPPED | OUTPATIENT
Start: 2018-02-22 | End: 2018-12-13

## 2018-03-10 NOTE — TELEPHONE ENCOUNTER
03/10/18 1100   Group 1   Start Time 0930   Stop Time 1015   Length (min) 45 Min   Attendance Not present   Group 2   Start Time 1030   Stop Time 1115   Length (min) 45 min   Attendance Not present      Per Dr. Blake's order, Lasix d/c'd and Bumex 1mg BID sent to pharmacy. Magnesium and BMP labs ordered for 2 weeks out and mailed to patient.

## 2018-04-20 ENCOUNTER — OFFICE VISIT (OUTPATIENT)
Dept: CARDIOLOGY | Facility: CLINIC | Age: 78
End: 2018-04-20

## 2018-04-20 VITALS
HEIGHT: 66 IN | HEART RATE: 74 BPM | DIASTOLIC BLOOD PRESSURE: 47 MMHG | SYSTOLIC BLOOD PRESSURE: 114 MMHG | BODY MASS INDEX: 30.86 KG/M2 | WEIGHT: 192 LBS

## 2018-04-20 DIAGNOSIS — E78.5 HYPERLIPIDEMIA LDL GOAL <70: ICD-10-CM

## 2018-04-20 DIAGNOSIS — I48.0 PAF (PAROXYSMAL ATRIAL FIBRILLATION) (HCC): Primary | ICD-10-CM

## 2018-04-20 DIAGNOSIS — I25.110 CORONARY ARTERY DISEASE INVOLVING NATIVE CORONARY ARTERY OF NATIVE HEART WITH UNSTABLE ANGINA PECTORIS (HCC): ICD-10-CM

## 2018-04-20 DIAGNOSIS — I10 SEVERE HYPERTENSION: ICD-10-CM

## 2018-04-20 DIAGNOSIS — E11.9 DIABETES MELLITUS TYPE 2, NONINSULIN DEPENDENT (HCC): ICD-10-CM

## 2018-04-20 PROCEDURE — 99214 OFFICE O/P EST MOD 30 MIN: CPT | Performed by: INTERNAL MEDICINE

## 2018-04-20 RX ORDER — ISOSORBIDE MONONITRATE 60 MG/1
30 TABLET, EXTENDED RELEASE ORAL EVERY MORNING
Qty: 30 TABLET | Refills: 6 | Status: SHIPPED | OUTPATIENT
Start: 2018-04-20 | End: 2018-04-27 | Stop reason: SDUPTHER

## 2018-04-20 RX ORDER — LUBIPROSTONE 24 UG/1
24 CAPSULE ORAL 2 TIMES DAILY WITH MEALS
COMMUNITY
End: 2021-07-30

## 2018-04-20 NOTE — PROGRESS NOTES
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     PHYSICIAN: James Mcfarlane DO   GASTROENTEROLOGIST: Tanner Rdz MD   ENDOCRINOLOGIST: Antonio Barrera MD   INTERVENTIONAL CARDIOLOGIST:  Vic Marks MD, [facility], [facility]    Chief Complaint:   Chief Complaint   Patient presents with   • Coronary Artery Disease   • Hypertension   • Dizziness     Problem List:  1. Remote progressive severe hypertension, 1997:  a. Acceptable renal nuclear blood flow scan/renal ultrasound/abnormal acceptable abdominal CT scan, April 1997.   b. Recurrent progressive hypertensive blood pressure readings/acceptable selective bilateral renal arteriography, May 2011.   c. Acceptable blood pressure control.  2. Intermittent paroxysmal atrial fibrillation, December 2009, with subsequent chronic oral anticoagulation x1 year.   3. Noninsulin dependent type 2 diabetes mellitus (hemoglobin A1c 5.8%), July 2011.   4. Remote hiatal hernia/probable GERD syndrome with remote EGD, April 1997.   5. Remote peptic ulcer disease with recurrent intermittent gastritis.   6. Osteoarthritis, predominantly involving the knees.   7. Peripheral vascular disease:  a. Asymptomatic bilateral carotid bruits with remote acceptable carotid duplex studies, April 1997.  b. Remote abnormal carotid duplex study with moderate right internal carotid artery stenosis (40% to 60%) with mild left internal carotid artery stenosis (20% to 40%), July 2011.   c. Abnormal carotid duplex study with bilateral moderate carotid artery atherosclerotic plaque/stenoses, January 2015, without focal motor-sensory changes.   8. Remote additional operations:  a. Bilateral benign breast cyst removal, 1985.   b. 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   c. Remote colon polypectomy with incidental findings of diverticulosis, August 1995.   9. Chronic hypothyroidism/replacement  therapy.  10. 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 Intermittent exertional dyspnea/chest pain syndrome/probable hypertensive cardiovascular disease:  b. Remote borderline abnormal IV Adenosine Quantitative SPECT Thallium 201 study, April 1997. Remote progressive CCS class III-IV chest pain syndrome with mild nonobstructive coronary atherosclerosis and mild reduction in LV function (LVEF 0.52), May 2001, with subsequent abnormal EGD demonstrating gastritis/treatment with resolution of symptoms.   c. Acceptable combination Doppler echocardiogram with mild concentric LVH, LVEF (0.55), March 2006.   d. Remote prolonged chest pain syndrome with observational stay ([facility]), June 2011, with subsequent diagnostic coronary angiography demonstrating moderate branch vessel coronary artery disease with acceptable flow wire assessment and nominal LV function, LVEF (0.60) with continued medical therapy felt warranted, July 2011.   e. Probable residual CCS class I angina pectoris with recent progressive atypical chest pain syndrome/normal EKG, August 2012, January 2013, May 2013.   f. Abnormal hybrid PET cardiac scan demonstrating small area of reversible LAD ischemia with prominent diffuse ischemic ST-T changes and acceptable preserved systolic function, 06/04/2013, with patient refusing further intervention with diagnostic coronary angiography 06/26/2013.  g. Echocardiogram showing an estimated EF of 0.45 to 0.50 with moderate tricuspid regurgitation and mild concentric left ventricular hypertrophy, 10/17/2014.   h. Non-ST elevation MI with left heart catheterization showing a nonobstructive single vessel coronary artery disease 50% ostial stenosis of the first diagonal branch, left anterior descending coronary artery, otherwise, no hemodynamically significant coronary artery disease and estimated EF of 35% with features of Takotsubo cardiomyopathy,  10/15/2014, by Dr. Marks.   i. Residual class I symptoms with acceptable echocardiogram, February 2016.  j. CCS class 2 chest pain/NYHA class II dyspnea December 2016  k. Left heart catheterization 1/5/2017; no evidence of hematoma and dynamically significant coronary artery disease, mild nonobstructive plaque in the LAD and first diagonal noted, LVEF 0.55, arterial hypertension, otherwise normal hemodynamics, medical therapy and risk factor management recommended  l. Residual class I symptoms  11. Intermittent tinnitus, AD, recurrent, October 2013.   12. Remote abnormal uterine bleeding with apparent abnormal uterine biopsy with apparent uterine cancer, stage and type unknown-data deficit, summer 2005, with subsequent SERG/BSO with apparent negative lymph node sampling for well differentiated endometrial carcinoma, September 2005.   13. Chronic tobacco use with abnormal chest x-ray with discontinued tobacco use, autumn 2011.   14. GERD/gastritis:  a. Abnormal EGD, May 2013.   b. Initiation of Dexilant therapy, January 2013, with continuation after EGD, May 2013  No Known Allergies      Current Outpatient Prescriptions:   •  amLODIPine (NORVASC) 5 MG tablet, take 1 tablet by mouth daily, Disp: 90 tablet, Rfl: 3  •  aspirin 81 MG EC tablet, Take 81 mg by mouth Daily., Disp: , Rfl:   •  atorvastatin (LIPITOR) 40 MG tablet, TAKE 1 TABLET DAILY, Disp: 90 tablet, Rfl: 2  •  bumetanide (BUMEX) 1 MG tablet, Take 1 tablet by mouth 2 (Two) Times a Day., Disp: 180 tablet, Rfl: 3  •  BYSTOLIC 5 MG tablet, take 1 tablet by mouth once daily, Disp: 90 tablet, Rfl: 2  •  clopidogrel (PLAVIX) 75 MG tablet, take 1 tablet by mouth once daily, Disp: 90 tablet, Rfl: 2  •  dexlansoprazole (DEXILANT) 60 MG capsule, Take 60 mg by mouth Daily., Disp: , Rfl:   •  isosorbide mononitrate (IMDUR) 30 MG 24 hr tablet, take 1 tablet by mouth every morning, Disp: 90 tablet, Rfl: 3  •  levothyroxine (SYNTHROID, LEVOTHROID) 75 MCG tablet, Take 75 mcg  by mouth Daily., Disp: , Rfl:   •  lisinopril (PRINIVIL,ZESTRIL) 20 MG tablet, take 1 tablet by mouth once daily, Disp: 90 tablet, Rfl: 1  •  lubiprostone (AMITIZA) 24 MCG capsule, Take 24 mcg by mouth 2 (Two) Times a Day With Meals., Disp: , Rfl:   •  metFORMIN (GLUCOPHAGE) 500 MG tablet, Take 1,000 mg by mouth 2 (Two) Times a Day With Meals., Disp: , Rfl:   •  Mometasone Furo-Formoterol Fum (DULERA IN), Inhale 1 puff Daily As Needed., Disp: , Rfl:   •  NITROSTAT 0.4 MG SL tablet, place 1 tablet under the tongue every 5 minutes for UP TO 3 doses if needed for angina as directed by prescriber, Disp: 25 tablet, Rfl: 2  •  potassium chloride (K-DUR) 10 MEQ CR tablet, Take 10 mEq by mouth Daily., Disp: , Rfl:   •  sucralfate (CARAFATE) 1 G tablet, Take 1 g by mouth 3 (Three) Times a Day., Disp: , Rfl:     HISTORY OF PRESENT ILLNESS:  Patient is here for a 7-month followup.  She denies chest pain, palpitations, edema, presyncope, or syncope.  She has noticed some shortness of breath and stomach discomfort when she walks.  She does not experience abdominal pain any other time or when she eats certain foods.  She has only noticed this in the past month.  It is not associated with any nausea or vomiting.  She cannot localize exactly where the pain is but states it is more of a lower abdominal pain.  She denies any recent bladder infections or dysuria.  She had blood work taken recently but is unsure of the results.  Patient otherwise denies chest pain, shortness of breath, PND, edema, palpitations, syncope or presyncope at this time.  She does have some intermittent left flank pain but does not have any colic symptoms with it.  No discolored urine, dysuria, urgency, or frequency.  She has lower back pain and is uncertain whether standing and walking causes radicular pain to her lower abdomen.  Overall, no alteration in GI or  function. No recent fever, chills, adenopathy, or constitutional complaints.      Review of  "Systems   HENT: Positive for tinnitus.    Respiratory: Positive for shortness of breath and wheezing.    Musculoskeletal: Positive for arthritis, muscle weakness and myalgias.   Allergic/Immunologic: Positive for environmental allergies.      Obtained and otherwise negative except as outlined in problem list and HPI.    Procedures       Objective:       Vitals:    04/20/18 1446 04/20/18 1453   BP: 136/59 114/47   BP Location: Left arm Left arm   Patient Position: Sitting Standing   Pulse: 58 74   Weight: 87.1 kg (192 lb)    Height: 167.6 cm (66\")      Body mass index is 30.99 kg/m².  Last weight August 2017 was 189 pounds    Physical Exam   Constitutional: She is oriented to person, place, and time. She appears well-developed and well-nourished.   Neck: No JVD present. Carotid bruit is present (bilateral). No thyromegaly present.   Cardiovascular: Regular rhythm, S1 normal and S2 normal.  Exam reveals no gallop, no S3 and no friction rub.    Murmur heard.   Medium-pitched harsh early systolic murmur is present with a grade of 2/6  at the upper right sternal border  Pulses:       Dorsalis pedis pulses are 1+ on the right side, and 1+ on the left side.        Posterior tibial pulses are 1+ on the right side, and 1+ on the left side.   Pulmonary/Chest: Effort normal and breath sounds normal. She has no wheezes. She has no rhonchi. She has no rales.   Abdominal: Soft. She exhibits no mass. There is no hepatosplenomegaly. There is no tenderness. There is no guarding.   Bowel sounds audible x4   Musculoskeletal: Normal range of motion. She exhibits no edema.   Lymphadenopathy:     She has no cervical adenopathy.   Neurological: She is alert and oriented to person, place, and time.   Skin: Skin is warm, dry and intact. No rash noted.   Vitals reviewed.        Lab Review:   Lab Results   Component Value Date    GLUCOSE 100 01/05/2017    BUN 28 (H) 01/05/2017    CREATININE 1.30 01/05/2017    EGFRIFNONA 40 (L) 01/05/2017    " BCR 21.5 01/05/2017    CO2 24.0 01/05/2017    CALCIUM 10.4 01/05/2017    ALBUMIN 4.30 01/05/2017    LABIL2 1.3 (L) 01/05/2017    AST 22 01/05/2017    ALT 14 01/05/2017       Lab Results   Component Value Date    WBC 5.82 01/05/2017    HGB 11.6 01/05/2017    HCT 35.5 01/05/2017    MCV 94.7 01/05/2017     (L) 01/05/2017       Lab Results   Component Value Date    HGBA1C 6.00 (H) 01/05/2017       Lab Results   Component Value Date    TSH 1.078 10/16/2014       Lab Results   Component Value Date    CHOL 154 01/05/2017     Lab Results   Component Value Date    TRIG 83 01/05/2017    TRIG 54 10/16/2014     Lab Results   Component Value Date    HDL 69 (H) 01/05/2017    HDL 54 10/16/2014     Lab Results   Component Value Date    LDL 59 01/05/2017    LDL 50 10/16/2014       Lab Results   Component Value Date     (H) 07/23/2014   · July 2017(Reviewed per physician letter):  · Electrolytes normal  · Serum calcium and magnesium normal  · Glucose - 148  · Creatinine - 1.8  · BUN - 49  · T3 - 86       Assessment:   Overall continued acceptable course with no interim cardiopulmonary complaints with acceptable functional status. We will defer additional diagnostic or therapeutic intervention from a cardiac perspective at this time. We would suggest that she follow with her physician for her abdominal pain with a possible ultrasound and UA.  We encouraged the patient to have her recent laboratory values sent to us when available.     Diagnosis Plan   1. PAF (paroxysmal atrial fibrillation)  Stable   2. Coronary artery disease involving native coronary artery of native heart with unstable angina pectoris  Stable; No recurrent angina pectoris or CHF.     3. Severe hypertension  Controlled   4. Diabetes mellitus type 2, noninsulin dependent  No new labs   5. Hyperlipidemia LDL goal <70  No new labs          Plan:         1. Patient to continue current medications and close follow up with the above providers other than to  alter her dose of Imdur to 60 mg daily.  2. Tentative cardiology follow up in October 2018, or patient may return sooner PRN.    Scribed for Alexy Blake MD by Charo Gould, SAMUEL. 4/20/2018  2:54 PM    I, Alexy Blake MD, [facility], personally performed the services described in this documentation as scribed by the above named individual in my presence, and it is both accurate and complete. At 3:18 PM on 04/20/2018

## 2018-04-27 RX ORDER — ISOSORBIDE MONONITRATE 60 MG/1
60 TABLET, EXTENDED RELEASE ORAL EVERY MORNING
Qty: 30 TABLET | Refills: 6 | Status: SHIPPED | OUTPATIENT
Start: 2018-04-27 | End: 2018-12-13

## 2018-05-16 RX ORDER — NEBIVOLOL HYDROCHLORIDE 5 MG/1
TABLET ORAL
Qty: 90 TABLET | Refills: 2 | Status: SHIPPED | OUTPATIENT
Start: 2018-05-16 | End: 2018-12-13 | Stop reason: SDUPTHER

## 2018-07-06 DIAGNOSIS — Z00.00 HEALTH CARE MAINTENANCE: ICD-10-CM

## 2018-07-06 RX ORDER — BUMETANIDE 1 MG/1
TABLET ORAL
Qty: 180 TABLET | Refills: 3 | Status: SHIPPED | OUTPATIENT
Start: 2018-07-06 | End: 2018-12-13 | Stop reason: SDUPTHER

## 2018-11-21 RX ORDER — CLOPIDOGREL BISULFATE 75 MG/1
TABLET ORAL
Qty: 90 TABLET | Refills: 2 | Status: SHIPPED | OUTPATIENT
Start: 2018-11-21 | End: 2018-12-13 | Stop reason: SDUPTHER

## 2018-12-10 NOTE — PROGRESS NOTES
Subjective:     Encounter Date:12/13/2018      Patient ID: Lino Guerrero is a 78 y.o. .  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     PHYSICIAN: James Mcfarlane DO   GASTROENTEROLOGIST: Tanner Rdz MD   ENDOCRINOLOGIST: Antonio Barrera MD   INTERVENTIONAL CARDIOLOGIST:  Vic Marks MD, [facility], [facility] .    Chief Complaint:   Chief Complaint   Patient presents with   • Atrial Fibrillation   • Coronary Artery Disease   • Hypertension     Problem List:  1. Remote progressive severe hypertension, 1997:  a. Acceptable renal nuclear blood flow scan/renal ultrasound/abnormal acceptable abdominal CT scan, April 1997.   b. Recurrent progressive hypertensive blood pressure readings/acceptable selective bilateral renal arteriography, May 2011.   c. Acceptable blood pressure control.  2. Intermittent paroxysmal atrial fibrillation, December 2009, with subsequent chronic oral anticoagulation x1 year.   3. Noninsulin dependent type 2 diabetes mellitus (hemoglobin A1c 5.8%), July 2011.   4. Remote hiatal hernia/probable GERD syndrome with remote EGD, April 1997.   5. Remote peptic ulcer disease with recurrent intermittent gastritis.   6. Osteoarthritis, predominantly involving the knees.   7. Peripheral vascular disease:  a. Asymptomatic bilateral carotid bruits with remote acceptable carotid duplex studies, April 1997.  b. Remote abnormal carotid duplex study with moderate right internal carotid artery stenosis (40% to 60%) with mild left internal carotid artery stenosis (20% to 40%), July 2011.   c. Abnormal carotid duplex study with bilateral moderate carotid artery atherosclerotic plaque/stenoses, January 2015, without focal motor-sensory changes.   8. Remote additional operations:  a. Bilateral benign breast cyst removal, 1985.   b. 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   c. Remote colon polypectomy with incidental findings of diverticulosis, August 1995.   9. Chronic hypothyroidism/replacement  therapy.  10. 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 Intermittent exertional dyspnea/chest pain syndrome/probable hypertensive cardiovascular disease:  b. Remote borderline abnormal IV Adenosine Quantitative SPECT Thallium 201 study, April 1997. Remote progressive CCS class III-IV chest pain syndrome with mild nonobstructive coronary atherosclerosis and mild reduction in LV function (LVEF 0.52), May 2001, with subsequent abnormal EGD demonstrating gastritis/treatment with resolution of symptoms.   c. Acceptable combination Doppler echocardiogram with mild concentric LVH, LVEF (0.55), March 2006.   d. Remote prolonged chest pain syndrome with observational stay ([facility]), June 2011, with subsequent diagnostic coronary angiography demonstrating moderate branch vessel coronary artery disease with acceptable flow wire assessment and nominal LV function, LVEF (0.60) with continued medical therapy felt warranted, July 2011.   e. Probable residual CCS class I angina pectoris with recent progressive atypical chest pain syndrome/normal EKG, August 2012, January 2013, May 2013.   f. Abnormal hybrid PET cardiac scan demonstrating small area of reversible LAD ischemia with prominent diffuse ischemic ST-T changes and acceptable preserved systolic function, 06/04/2013, with patient refusing further intervention with diagnostic coronary angiography 06/26/2013.  g. Echocardiogram showing an estimated EF of 0.45 to 0.50 with moderate tricuspid regurgitation and mild concentric left ventricular hypertrophy, 10/17/2014.   h. Non-ST elevation MI with left heart catheterization showing a nonobstructive single vessel coronary artery disease 50% ostial stenosis of the first diagonal branch, left anterior descending coronary artery, otherwise, no hemodynamically significant coronary artery disease and estimated EF of 35% with features of Takotsubo cardiomyopathy,  10/15/2014, by Dr. Marks.   i. Residual class I symptoms with acceptable echocardiogram, February 2016.  j. CCS class 2 chest pain/NYHA class II dyspnea December 2016  k. Left heart catheterization 1/5/2017; no evidence of hematoma and dynamically significant coronary artery disease, mild nonobstructive plaque in the LAD and first diagonal noted, LVEF 0.55, arterial hypertension, otherwise normal hemodynamics, medical therapy and risk factor management recommended  l. Residual class I symptoms  11. Intermittent tinnitus, AD, recurrent, October 2013.   12. Remote abnormal uterine bleeding with apparent abnormal uterine biopsy with apparent uterine cancer, stage and type unknown-data deficit, summer 2005, with subsequent SERG/BSO with apparent negative lymph node sampling for well differentiated endometrial carcinoma, September 2005.   13. Chronic tobacco use with abnormal chest x-ray with discontinued tobacco use, autumn 2011.   14. GERD/gastritis:  a. Abnormal EGD, May 2013.   b. Initiation of Dexilant therapy, January 2013, with continuation after EGD, May 2013  No Known Allergies      Current Outpatient Medications:   •  amLODIPine (NORVASC) 5 MG tablet, take 1 tablet by mouth daily, Disp: 90 tablet, Rfl: 3  •  aspirin 81 MG EC tablet, Take 81 mg by mouth Daily., Disp: , Rfl:   •  atorvastatin (LIPITOR) 40 MG tablet, TAKE 1 TABLET DAILY, Disp: 90 tablet, Rfl: 2  •  bumetanide (BUMEX) 1 MG tablet, take 1 tablet by mouth twice a day, Disp: 180 tablet, Rfl: 3  •  BYSTOLIC 5 MG tablet, take 1 tablet by mouth once daily, Disp: 90 tablet, Rfl: 2  •  clopidogrel (PLAVIX) 75 MG tablet, take 1 tablet by mouth once daily, Disp: 90 tablet, Rfl: 2  •  dexlansoprazole (DEXILANT) 60 MG capsule, Take 60 mg by mouth Daily., Disp: , Rfl:   •  glipiZIDE (GLUCOTROL XL) 2.5 MG 24 hr tablet, Take 2.5 mg by mouth Daily., Disp: , Rfl:   •  levothyroxine (SYNTHROID, LEVOTHROID) 75 MCG tablet, Take 125 mcg by mouth Daily., Disp: , Rfl:   •   lubiprostone (AMITIZA) 24 MCG capsule, Take 24 mcg by mouth 2 (Two) Times a Day With Meals., Disp: , Rfl:   •  metFORMIN (GLUCOPHAGE) 500 MG tablet, Take 500 mg by mouth 2 (Two) Times a Day With Meals., Disp: , Rfl:   •  Mometasone Furo-Formoterol Fum (DULERA IN), Inhale 1 puff Daily As Needed., Disp: , Rfl:   •  NITROSTAT 0.4 MG SL tablet, place 1 tablet under the tongue every 5 minutes for UP TO 3 doses if needed for angina as directed by prescriber, Disp: 25 tablet, Rfl: 2  •  potassium chloride (K-DUR) 10 MEQ CR tablet, Take 10 mEq by mouth Daily., Disp: , Rfl:     HISTORY OF PRESENT ILLNESS:  Patient is here for a 7 month follow up.  At her last appointment we increased her Imdur to 60 mg daily.  She notes improvement in chest pain symptoms.  Occasionally she'll have some shortness of breath, but she is able to perform her household chores without much difficulty.  She denies any palpitations, presyncope, or syncope.  She thought that she lost her wallet prior to coming into her appointment today so her blood pressure initially was elevated.  She states that it is normally not that high.  She had laboratory testing about 2-3 months ago and to her knowledge it was acceptable except her hemoglobin was slightly low-data deficit.  She was advised at that time to adjust her diet and was not placed on iron placement therapy.      Review of Systems   Constitution: Positive for weight gain.   HENT: Positive for tinnitus.    Eyes: Positive for vision loss in left eye and vision loss in right eye.   Cardiovascular: Positive for chest pain, leg swelling and palpitations.   Respiratory: Positive for cough and wheezing.    Hematologic/Lymphatic: Bruises/bleeds easily.   Skin: Positive for skin cancer.   Musculoskeletal: Positive for arthritis, back pain, joint pain, joint swelling, neck pain and stiffness.   Gastrointestinal: Positive for constipation.   Neurological: Positive for loss of balance.   Psychiatric/Behavioral:  "Positive for altered mental status.      Obtained and otherwise negative except as outlined in problem list and HPI.    Procedures       Objective:       Vitals:    12/13/18 1121 12/13/18 1123   BP: 157/56 146/57   BP Location: Left arm Left arm   Patient Position: Sitting Standing   Pulse: 69 75   Weight: 87.5 kg (193 lb)    Height: 167.6 cm (66\")    Recheck blood pressure right arm sitting was 124/58  Body mass index is 31.15 kg/m².  Last weight April 2018 was 192 pounds  Physical Exam   Constitutional: She is oriented to person, place, and time. She appears well-developed and well-nourished.   Neck: No JVD present. Carotid bruit is present (bilateral). No thyromegaly present.   Cardiovascular: Regular rhythm, S1 normal and S2 normal. Exam reveals no gallop, no S3 and no friction rub.   Murmur heard.   Medium-pitched harsh early systolic murmur is present with a grade of 2/6 at the upper right sternal border.  Pulses:       Dorsalis pedis pulses are 1+ on the right side, and 1+ on the left side.        Posterior tibial pulses are 1+ on the right side, and 1+ on the left side.   Pulmonary/Chest: Effort normal. She has decreased breath sounds. She has no wheezes. She has no rhonchi. She has no rales.   Abdominal: Soft. She exhibits no mass. There is no hepatosplenomegaly. There is no tenderness. There is no guarding.   Bowel sounds audible x4   Musculoskeletal: Normal range of motion. She exhibits no edema.   Lymphadenopathy:     She has no cervical adenopathy.   Neurological: She is alert and oriented to person, place, and time.   Skin: Skin is warm, dry and intact. No rash noted.   Vitals reviewed.        Lab Review:   Lab Results   Component Value Date    GLUCOSE 100 01/05/2017    BUN 28 (H) 01/05/2017    CREATININE 1.30 01/05/2017    EGFRIFNONA 40 (L) 01/05/2017    BCR 21.5 01/05/2017    CO2 24.0 01/05/2017    CALCIUM 10.4 01/05/2017    ALBUMIN 4.30 01/05/2017    AST 22 01/05/2017    ALT 14 01/05/2017       Lab " Results   Component Value Date    WBC 5.82 01/05/2017    HGB 11.6 01/05/2017    HCT 35.5 01/05/2017    MCV 94.7 01/05/2017     (L) 01/05/2017       Lab Results   Component Value Date    HGBA1C 6.00 (H) 01/05/2017       Lab Results   Component Value Date    TSH 1.078 10/16/2014       Lab Results   Component Value Date    CHOL 154 01/05/2017     Lab Results   Component Value Date    TRIG 83 01/05/2017    TRIG 54 10/16/2014     Lab Results   Component Value Date    HDL 69 (H) 01/05/2017    HDL 54 10/16/2014     Lab Results   Component Value Date    LDL 59 01/05/2017    LDL 50 10/16/2014       Lab Results   Component Value Date     (H) 07/23/2014         Assessment:     Overall continued acceptable course with no interim cardiopulmonary complaints with acceptable functional status. We will defer additional diagnostic or therapeutic intervention from a cardiac perspective at this time.     Diagnosis Plan   1. PAF (paroxysmal atrial fibrillation) (CMS/[facility])  Stable   2. Coronary artery disease involving native coronary artery of native heart with unstable angina pectoris (CMS/[facility])  Stable   3. Severe hypertension  Controlled          Plan:         1. Patient to continue current medications and close follow up with the above providers.  2. Tentative cardiology follow up in 6 months or patient may return sooner PRN.      Electronically signed by SAMUEL Swan, 12/13/18, 11:53 AM.

## 2018-12-13 ENCOUNTER — OFFICE VISIT (OUTPATIENT)
Dept: CARDIOLOGY | Facility: CLINIC | Age: 78
End: 2018-12-13

## 2018-12-13 VITALS
HEART RATE: 75 BPM | WEIGHT: 193 LBS | HEIGHT: 66 IN | DIASTOLIC BLOOD PRESSURE: 57 MMHG | BODY MASS INDEX: 31.02 KG/M2 | SYSTOLIC BLOOD PRESSURE: 146 MMHG

## 2018-12-13 DIAGNOSIS — Z00.00 HEALTH CARE MAINTENANCE: ICD-10-CM

## 2018-12-13 DIAGNOSIS — I48.0 PAF (PAROXYSMAL ATRIAL FIBRILLATION) (HCC): Primary | ICD-10-CM

## 2018-12-13 DIAGNOSIS — I25.110 CORONARY ARTERY DISEASE INVOLVING NATIVE CORONARY ARTERY OF NATIVE HEART WITH UNSTABLE ANGINA PECTORIS (HCC): ICD-10-CM

## 2018-12-13 DIAGNOSIS — I10 SEVERE HYPERTENSION: ICD-10-CM

## 2018-12-13 PROCEDURE — 99214 OFFICE O/P EST MOD 30 MIN: CPT | Performed by: NURSE PRACTITIONER

## 2018-12-13 RX ORDER — POTASSIUM CHLORIDE 750 MG/1
10 TABLET, FILM COATED, EXTENDED RELEASE ORAL DAILY
Qty: 90 TABLET | Refills: 3 | Status: SHIPPED | OUTPATIENT
Start: 2018-12-13 | End: 2019-10-25

## 2018-12-13 RX ORDER — GLIPIZIDE 2.5 MG/1
2.5 TABLET, EXTENDED RELEASE ORAL DAILY
COMMUNITY
End: 2019-10-25

## 2018-12-13 RX ORDER — CLOPIDOGREL BISULFATE 75 MG/1
75 TABLET ORAL DAILY
Qty: 90 TABLET | Refills: 3 | Status: SHIPPED | OUTPATIENT
Start: 2018-12-13 | End: 2020-10-01 | Stop reason: SDUPTHER

## 2018-12-13 RX ORDER — AMLODIPINE BESYLATE 5 MG/1
5 TABLET ORAL DAILY
Qty: 90 TABLET | Refills: 3 | Status: SHIPPED | OUTPATIENT
Start: 2018-12-13 | End: 2019-10-25 | Stop reason: DRUGHIGH

## 2018-12-13 RX ORDER — NITROGLYCERIN 0.4 MG/1
0.4 TABLET SUBLINGUAL
Qty: 25 TABLET | Refills: 6 | Status: SHIPPED | OUTPATIENT
Start: 2018-12-13 | End: 2020-10-01 | Stop reason: SDUPTHER

## 2018-12-13 RX ORDER — NEBIVOLOL 5 MG/1
5 TABLET ORAL DAILY
Qty: 90 TABLET | Refills: 3 | Status: SHIPPED | OUTPATIENT
Start: 2018-12-13 | End: 2020-10-01 | Stop reason: SDUPTHER

## 2018-12-13 RX ORDER — ATORVASTATIN CALCIUM 40 MG/1
40 TABLET, FILM COATED ORAL DAILY
Qty: 90 TABLET | Refills: 3 | Status: SHIPPED | OUTPATIENT
Start: 2018-12-13 | End: 2021-07-30

## 2018-12-13 RX ORDER — BUMETANIDE 1 MG/1
1 TABLET ORAL 2 TIMES DAILY
Qty: 180 TABLET | Refills: 3 | Status: SHIPPED | OUTPATIENT
Start: 2018-12-13 | End: 2020-10-01 | Stop reason: SDUPTHER

## 2019-10-25 ENCOUNTER — OFFICE VISIT (OUTPATIENT)
Dept: CARDIOLOGY | Facility: CLINIC | Age: 79
End: 2019-10-25

## 2019-10-25 VITALS
SYSTOLIC BLOOD PRESSURE: 180 MMHG | HEART RATE: 56 BPM | BODY MASS INDEX: 31.37 KG/M2 | DIASTOLIC BLOOD PRESSURE: 68 MMHG | WEIGHT: 195.2 LBS | HEIGHT: 66 IN

## 2019-10-25 DIAGNOSIS — E11.9 DIABETES MELLITUS TYPE 2, NONINSULIN DEPENDENT (HCC): ICD-10-CM

## 2019-10-25 DIAGNOSIS — I10 SEVERE HYPERTENSION: Primary | ICD-10-CM

## 2019-10-25 DIAGNOSIS — I48.0 PAF (PAROXYSMAL ATRIAL FIBRILLATION) (HCC): ICD-10-CM

## 2019-10-25 DIAGNOSIS — I25.9 ISCHEMIC HEART DISEASE: ICD-10-CM

## 2019-10-25 DIAGNOSIS — E78.5 DYSLIPIDEMIA: ICD-10-CM

## 2019-10-25 PROCEDURE — 99214 OFFICE O/P EST MOD 30 MIN: CPT | Performed by: INTERNAL MEDICINE

## 2019-10-25 PROCEDURE — 90471 IMMUNIZATION ADMIN: CPT | Performed by: INTERNAL MEDICINE

## 2019-10-25 PROCEDURE — 90653 IIV ADJUVANT VACCINE IM: CPT | Performed by: INTERNAL MEDICINE

## 2019-10-25 RX ORDER — LEVOTHYROXINE SODIUM 0.15 MG/1
150 TABLET ORAL DAILY
Refills: 0 | COMMUNITY
Start: 2019-10-14 | End: 2022-07-29 | Stop reason: SDUPTHER

## 2019-10-25 RX ORDER — RANITIDINE 150 MG/1
150 TABLET ORAL DAILY
COMMUNITY
End: 2020-05-07

## 2019-10-25 RX ORDER — LORAZEPAM 0.5 MG/1
0.5 TABLET ORAL NIGHTLY PRN
Refills: 0 | COMMUNITY
Start: 2019-10-01 | End: 2020-09-30

## 2019-10-25 RX ORDER — ISOSORBIDE MONONITRATE 60 MG/1
60 TABLET, EXTENDED RELEASE ORAL 3 TIMES DAILY
Refills: 0 | COMMUNITY
Start: 2019-08-30 | End: 2019-10-25 | Stop reason: DRUGHIGH

## 2019-10-25 RX ORDER — AMLODIPINE BESYLATE 2.5 MG/1
2.5 TABLET ORAL DAILY
Refills: 0 | COMMUNITY
Start: 2019-10-01 | End: 2019-10-25 | Stop reason: DRUGHIGH

## 2019-10-25 RX ORDER — ISOSORBIDE MONONITRATE 120 MG/1
120 TABLET, EXTENDED RELEASE ORAL DAILY
Qty: 90 TABLET | Refills: 3 | Status: SHIPPED | OUTPATIENT
Start: 2019-10-25 | End: 2020-10-01 | Stop reason: SDUPTHER

## 2019-10-25 RX ORDER — SITAGLIPTIN 50 MG/1
50 TABLET, FILM COATED ORAL DAILY
Refills: 0 | COMMUNITY
Start: 2019-10-15 | End: 2021-07-30

## 2019-10-25 RX ORDER — AMLODIPINE BESYLATE 10 MG/1
10 TABLET ORAL DAILY
Qty: 90 TABLET | Refills: 3 | Status: SHIPPED | OUTPATIENT
Start: 2019-10-25 | End: 2020-10-01 | Stop reason: SDUPTHER

## 2019-10-25 NOTE — PROGRESS NOTES
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     CURRENT HEALTHCARE PROVIDER:  SAMUEL Wayne  FORMER PHYSICIAN: James Mcfarlane DO   GASTROENTEROLOGIST: Tanner Rdz MD   ENDOCRINOLOGIST: Antonio Barrera MD   INTERVENTIONAL CARDIOLOGIST:  Vic Marks MD, [facility], [facility]    Chief Complaint:   Chief Complaint   Patient presents with   • Atrial Fibrillation   • Coronary Artery Disease   • Hypertension     Problem List:  1. Remote progressive severe hypertension, 1997:  a. Acceptable renal nuclear blood flow scan/renal ultrasound/abnormal acceptable abdominal CT scan, April 1997.   b. Recurrent progressive hypertensive blood pressure readings/acceptable selective bilateral renal arteriography, May 2011.   c. Remote acceptable blood pressure control with recent progressive hypertensive blood pressure readings and apparent abnormal renal function with Nephrology referral recommended - data deficit, summer-autumn 2019.  2. Intermittent paroxysmal atrial fibrillation, December 2009, with subsequent chronic oral anticoagulation x1 year.   3. Noninsulin dependent type 2 diabetes mellitus (hemoglobin A1c 5.8%), July 2011.   4. Remote hiatal hernia/probable GERD syndrome with remote EGD, April 1997.   5. Remote peptic ulcer disease with recurrent intermittent gastritis.   6. Osteoarthritis, predominantly involving the knees.   7. Peripheral vascular disease:  a. Asymptomatic bilateral carotid bruits with remote acceptable carotid duplex studies, April 1997.  b. Remote abnormal carotid duplex study with moderate right internal carotid artery stenosis (40% to 60%) with mild left internal carotid artery stenosis (20% to 40%), July 2011.   c. Abnormal carotid duplex study with bilateral moderate carotid artery atherosclerotic plaque/stenoses, January 2015, without focal motor-sensory changes.   8. Remote additional  operations:  a. Bilateral benign breast cyst removal, 1985.   b. 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   c. Remote colon polypectomy with incidental findings of diverticulosis, August 1995.   9. Chronic hypothyroidism/replacement therapy.  10. 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 Intermittent exertional dyspnea/chest pain syndrome/probable hypertensive cardiovascular disease:  b. Remote borderline abnormal IV Adenosine Quantitative SPECT Thallium 201 study, April 1997. Remote progressive CCS class III-IV chest pain syndrome with mild nonobstructive coronary atherosclerosis and mild reduction in LV function (LVEF 0.52), May 2001, with subsequent abnormal EGD demonstrating gastritis/treatment with resolution of symptoms.   c. Acceptable combination Doppler echocardiogram with mild concentric LVH, LVEF (0.55), March 2006.   d. Remote prolonged chest pain syndrome with observational stay ([facility]), June 2011, with subsequent diagnostic coronary angiography demonstrating moderate branch vessel coronary artery disease with acceptable flow wire assessment and nominal LV function, LVEF (0.60) with continued medical therapy felt warranted, July 2011.   e. Probable residual CCS class I angina pectoris with recent progressive atypical chest pain syndrome/normal EKG, August 2012, January 2013, May 2013.   f. Abnormal hybrid PET cardiac scan demonstrating small area of reversible LAD ischemia with prominent diffuse ischemic ST-T changes and acceptable preserved systolic function, 06/04/2013, with patient refusing further intervention with diagnostic coronary angiography 06/26/2013.  g. Echocardiogram showing an estimated EF of 0.45 to 0.50 with moderate tricuspid regurgitation and mild concentric left ventricular hypertrophy, 10/17/2014.   h. Non-ST elevation MI with left heart catheterization showing a nonobstructive single vessel coronary artery disease  50% ostial stenosis of the first diagonal branch, left anterior descending coronary artery, otherwise, no hemodynamically significant coronary artery disease and estimated EF of 35% with features of Takotsubo cardiomyopathy, 10/15/2014, by Dr. Marks.   i. Residual class I symptoms with acceptable echocardiogram, February 2016.  j. CCS class 2 chest pain/NYHA class II dyspnea December 2016  k. Left heart catheterization 1/5/2017; no evidence of hematoma and dynamically significant coronary artery disease, mild nonobstructive plaque in the LAD and first diagonal noted, LVEF 0.55, arterial hypertension, otherwise normal hemodynamics, medical therapy and risk factor management recommended  l. Residual class I symptoms, October 2019  11. Intermittent tinnitus, AD, recurrent, October 2013.   12. Remote abnormal uterine bleeding with apparent abnormal uterine biopsy with apparent uterine cancer, stage and type unknown-data deficit, summer 2005, with subsequent SERG/BSO with apparent negative lymph node sampling for well differentiated endometrial carcinoma, September 2005.   13. Chronic tobacco use with abnormal chest x-ray with discontinued tobacco use, autumn 2011.   14. GERD/gastritis:  a. Abnormal EGD, May 2013.   b. Initiation of Dexilant therapy, January 2013, with continuation after EGD, May 2013    No Known Allergies      Current Outpatient Medications:   •  amLODIPine (NORVASC) 2.5 MG tablet, Take 2.5 mg by mouth Daily., Disp: , Rfl: 0  •  aspirin 81 MG EC tablet, Take 81 mg by mouth Daily., Disp: , Rfl:   •  atorvastatin (LIPITOR) 40 MG tablet, Take 1 tablet by mouth Daily., Disp: 90 tablet, Rfl: 3  •  bumetanide (BUMEX) 1 MG tablet, Take 1 tablet by mouth 2 (Two) Times a Day., Disp: 180 tablet, Rfl: 3  •  clopidogrel (PLAVIX) 75 MG tablet, Take 1 tablet by mouth Daily., Disp: 90 tablet, Rfl: 3  •  diclofenac (VOLTAREN) 1 % gel gel, apply 4 grams to affected area four times a day ( APPLY TO THE KNEE,ANKEL, AND  "FOOT, Disp: , Rfl: 0  •  isosorbide mononitrate (IMDUR) 60 MG 24 hr tablet, 60 mg 3 (Three) Times a Day., Disp: , Rfl: 0  •  JANUVIA 50 MG tablet, 50 mg Daily., Disp: , Rfl: 0  •  levothyroxine (SYNTHROID, LEVOTHROID) 150 MCG tablet, Take 150 mcg by mouth Daily., Disp: , Rfl: 0  •  LORazepam (ATIVAN) 0.5 MG tablet, Take 0.5 mg by mouth At Night As Needed., Disp: , Rfl: 0  •  lubiprostone (AMITIZA) 24 MCG capsule, Take 24 mcg by mouth 2 (Two) Times a Day With Meals., Disp: , Rfl:   •  Mometasone Furo-Formoterol Fum (DULERA IN), Inhale 1 puff Daily As Needed (100mcg/5mcg)., Disp: , Rfl:   •  nebivolol (BYSTOLIC) 5 MG tablet, Take 1 tablet by mouth Daily., Disp: 90 tablet, Rfl: 3  •  nitroglycerin (NITROSTAT) 0.4 MG SL tablet, Place 1 tablet under the tongue Every 5 (Five) Minutes As Needed for Chest Pain. Take no more than 3 doses in 15 minutes., Disp: 25 tablet, Rfl: 6  •  raNITIdine (ZANTAC) 150 MG tablet, Take 150 mg by mouth Daily., Disp: , Rfl:     HISTORY OF PRESENT ILLNESS: Patient returns for followup after a 10-1/2 month hiatus; she was to have returned in June 2019, but she canceled that appointment due to transportation problems.  She says she has \"been good\" since last being seen in our office.  She got through the summer without any problems.  She has had no ER visits, hospitalizations, serious illnesses, or surgeries in the interim.  She has had labs drawn in her healthcare provider's office but does not know the results.  She says that she was told she has \"stage I kidney disease,\" but she is advised that \"stage I\" would indicate normal kidney function.  She has been referred to a nephrologist in [place], but she does not know their name.  She has not had a flu shot yet because her healthcare provider's office had run out of them the last time she saw them.  Patient otherwise denies chest pain, shortness of breath, PND, edema, palpitations, syncope or presyncope at this time on limited activity.  She is " "uncertain of her home blood pressure readings.        Review of Systems   Constitution: Positive for weakness, malaise/fatigue and weight gain.   Cardiovascular: Positive for chest pain, dyspnea on exertion, leg swelling and palpitations.   Respiratory: Positive for cough, shortness of breath and wheezing.    Endocrine: Positive for heat intolerance.   Hematologic/Lymphatic: Bruises/bleeds easily.   Musculoskeletal: Positive for arthritis, back pain, joint pain, joint swelling, muscle cramps and neck pain.   Gastrointestinal: Positive for bloating and abdominal pain.   Neurological: Positive for dizziness, loss of balance and numbness.      All other systems reviewed and otherwise negative.    Procedures       Objective:       Vitals:    10/25/19 1323 10/25/19 1325 10/25/19 1344   BP: (!) 181/65 175/63 180/68   BP Location: Left arm Left arm Left arm   Patient Position: Sitting Standing Sitting   Pulse: 54 56    Weight: 88.5 kg (195 lb 3.2 oz)     Height: 167.6 cm (66\")       Body mass index is 31.51 kg/m².   Last weight:  193 lbs.    Physical Exam   Constitutional: She is oriented to person, place, and time. She appears well-developed and well-nourished.   Neck: No JVD present. Carotid bruit is present (right). No thyromegaly present.   Cardiovascular: Regular rhythm, S1 normal and S2 normal. Exam reveals no gallop, no S3 and no friction rub.   Murmur heard.   Medium-pitched early systolic murmur is present with a grade of 2/6 at the upper right sternal border.  Pulses:       Carotid pulses are 1+ on the right side, and 1+ on the left side.       Radial pulses are 1+ on the right side, and 1+ on the left side.        Femoral pulses are 1+ on the right side, and 1+ on the left side.       Popliteal pulses are 1+ on the right side, and 1+ on the left side.        Dorsalis pedis pulses are 1+ on the right side, and 1+ on the left side.        Posterior tibial pulses are 1+ on the right side, and 1+ on the left side. "   Pulmonary/Chest: Effort normal. She has decreased breath sounds. She has no wheezes. She has no rhonchi. She has no rales.   Abdominal: Soft. She exhibits no mass. There is no hepatosplenomegaly. There is no tenderness. There is no guarding.   Bowel sounds audible x4   Musculoskeletal: Normal range of motion. She exhibits no edema.   Lymphadenopathy:     She has no cervical adenopathy.   Neurological: She is alert and oriented to person, place, and time.   Skin: Skin is warm, dry and intact. No rash noted.   Vitals reviewed.        Lab Review: No recent laboratory results available for review today    Lab Results   Component Value Date    GLUCOSE 100 01/05/2017    BUN 28 (H) 01/05/2017    CREATININE 1.30 01/05/2017    EGFRIFNONA 40 (L) 01/05/2017    BCR 21.5 01/05/2017    CO2 24.0 01/05/2017    CALCIUM 10.4 01/05/2017    ALBUMIN 4.30 01/05/2017    AST 22 01/05/2017    ALT 14 01/05/2017       Lab Results   Component Value Date    WBC 5.82 01/05/2017    HGB 11.6 01/05/2017    HCT 35.5 01/05/2017    MCV 94.7 01/05/2017     (L) 01/05/2017       Lab Results   Component Value Date    HGBA1C 6.00 (H) 01/05/2017       Lab Results   Component Value Date    TSH 1.078 10/16/2014       Lab Results   Component Value Date    CHOL 154 01/05/2017     Lab Results   Component Value Date    TRIG 83 01/05/2017    TRIG 54 10/16/2014     Lab Results   Component Value Date    HDL 69 (H) 01/05/2017    HDL 54 10/16/2014     Lab Results   Component Value Date    LDL 59 01/05/2017    LDL 50 10/16/2014       Lab Results   Component Value Date     (H) 07/23/2014           Assessment:   Overall continued acceptable course with no new interim cardiopulmonary complaints with acceptable functional status. We will defer additional diagnostic or therapeutic intervention from a cardiac perspective at this time other than to adjust her medications to treat her uncontrolled hypertension.  If she truly has impaired renal function, she  needs to undergo a renal artery duplex study.       Diagnosis Plan   1. Severe hypertension  Uncontrolled; needs to consider renal artery duplex study, particularly if she has progressive azotemia.  Medications have been adjusted as outlined below.   2. Ischemic heart disease  No recurrent angina pectoris or CHF on current activity schedule; continue current treatment     3. PAF (paroxysmal atrial fibrillation) (CMS/[facility])  No documented recurrence   4. Diabetes mellitus type 2, noninsulin dependent (CMS/[facility])  No data to review; continue Januvia.  May be a candidate for Jardiance.   5. Dyslipidemia  No data to review; continue atorvastatin.          Plan:         1. Patient to continue current medications and close follow up with the above providers with the following alterations:   A. Alter imdur to 120 mg qam   B. Alter amlodipine to 10 mg daily  2. Influenza immunization administered today in left deltoid without complication.  3. 1[facility] card provided with fax number provided patient.  4. Consider renal artery duplex study, particularly if impaired GFR present.  5. Tentative cardiology follow up in March 2020, or patient may return sooner PRN.    Transcribed by Keeley Monroe for Dr. Alexy Blake at 1:43 PM on 10/25/2019    IAlexy MD, Merged with [facility], personally performed the services described in this documentation as scribed by the above named individual in my presence, and it is both accurate and complete. At 2:12 PM on 10/25/2019

## 2020-05-07 ENCOUNTER — OFFICE VISIT (OUTPATIENT)
Dept: CARDIOLOGY | Facility: CLINIC | Age: 80
End: 2020-05-07

## 2020-05-07 VITALS
HEIGHT: 66 IN | SYSTOLIC BLOOD PRESSURE: 170 MMHG | BODY MASS INDEX: 31.34 KG/M2 | HEART RATE: 65 BPM | DIASTOLIC BLOOD PRESSURE: 65 MMHG | WEIGHT: 195 LBS

## 2020-05-07 DIAGNOSIS — I73.9 PVD (PERIPHERAL VASCULAR DISEASE) (HCC): ICD-10-CM

## 2020-05-07 DIAGNOSIS — I48.0 PAF (PAROXYSMAL ATRIAL FIBRILLATION) (HCC): ICD-10-CM

## 2020-05-07 DIAGNOSIS — I10 SEVERE HYPERTENSION: Primary | ICD-10-CM

## 2020-05-07 DIAGNOSIS — E78.5 DYSLIPIDEMIA: ICD-10-CM

## 2020-05-07 DIAGNOSIS — I25.9 ISCHEMIC HEART DISEASE: ICD-10-CM

## 2020-05-07 DIAGNOSIS — E11.9 DIABETES MELLITUS TYPE 2, NONINSULIN DEPENDENT (HCC): ICD-10-CM

## 2020-05-07 PROCEDURE — 99441 PR PHYS/QHP TELEPHONE EVALUATION 5-10 MIN: CPT | Performed by: INTERNAL MEDICINE

## 2020-05-07 NOTE — PROGRESS NOTES
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     CURRENT HEALTHCARE PROVIDER:  SAMUEL Wayne  FORMER PHYSICIAN: James Mcfarlane DO   GASTROENTEROLOGIST: Tanner Rdz MD   ENDOCRINOLOGIST: Antonio Barrera MD   INTERVENTIONAL CARDIOLOGIST:  Vic Marks MD, [facility], [facility]    Chief Complaint:   Chief Complaint   Patient presents with   • Atrial Fibrillation   • Dizziness     Problem List:  1. Remote progressive severe hypertension, 1997:  a. Acceptable renal nuclear blood flow scan/renal ultrasound/abnormal acceptable abdominal CT scan, April 1997.   b. Recurrent progressive hypertensive blood pressure readings/acceptable selective bilateral renal arteriography, May 2011.   c. Remote acceptable blood pressure control with recent progressive hypertensive blood pressure readings and apparent abnormal renal function with Nephrology referral recommended - data deficit, summer-autumn 2019.  2. Intermittent paroxysmal atrial fibrillation, December 2009, with subsequent chronic oral anticoagulation x1 year.   3. Noninsulin dependent type 2 diabetes mellitus (hemoglobin A1c 5.8%), July 2011.   4. Remote hiatal hernia/probable GERD syndrome with remote EGD, April 1997.   5. Remote peptic ulcer disease with recurrent intermittent gastritis.   6. Osteoarthritis, predominantly involving the knees.   7. Peripheral vascular disease:  a. Asymptomatic bilateral carotid bruits with remote acceptable carotid duplex studies, April 1997.  b. Remote abnormal carotid duplex study with moderate right internal carotid artery stenosis (40% to 60%) with mild left internal carotid artery stenosis (20% to 40%), July 2011.   c. Abnormal carotid duplex study with bilateral moderate carotid artery atherosclerotic plaque/stenoses, January 2015, without focal motor-sensory changes.   8. Remote additional operations:  a. Bilateral benign breast  cyst removal, 1985.   b. 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   c. Remote colon polypectomy with incidental findings of diverticulosis, August 1995.   9. Chronic hypothyroidism/replacement therapy with elevated serum TSH, October 2019.  10. 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 Intermittent exertional dyspnea/chest pain syndrome/probable hypertensive cardiovascular disease:  b. Remote borderline abnormal IV Adenosine Quantitative SPECT Thallium 201 study, April 1997. Remote progressive CCS class III-IV chest pain syndrome with mild nonobstructive coronary atherosclerosis and mild reduction in LV function (LVEF 0.52), May 2001, with subsequent abnormal EGD demonstrating gastritis/treatment with resolution of symptoms.   c. Acceptable combination Doppler echocardiogram with mild concentric LVH, LVEF (0.55), March 2006.   d. Remote prolonged chest pain syndrome with observational stay ([facility]), June 2011, with subsequent diagnostic coronary angiography demonstrating moderate branch vessel coronary artery disease with acceptable flow wire assessment and nominal LV function, LVEF (0.60) with continued medical therapy felt warranted, July 2011.   e. Probable residual CCS class I angina pectoris with recent progressive atypical chest pain syndrome/normal EKG, August 2012, January 2013, May 2013.   f. Abnormal hybrid PET cardiac scan demonstrating small area of reversible LAD ischemia with prominent diffuse ischemic ST-T changes and acceptable preserved systolic function, 06/04/2013, with patient refusing further intervention with diagnostic coronary angiography 06/26/2013.  g. Echocardiogram showing an estimated EF of 0.45 to 0.50 with moderate tricuspid regurgitation and mild concentric left ventricular hypertrophy, 10/17/2014.   h. Non-ST elevation MI with left heart catheterization showing nonobstructive single vessel coronary artery disease,  50% ostial stenosis of the first diagonal branch, left anterior descending coronary artery, otherwise, no hemodynamically significant coronary artery disease and estimated EF of 0.35 with features of Takotsubo cardiomyopathy, 10/15/2014, by Dr. Marks.   i. Residual class I symptoms with acceptable echocardiogram, February 2016.  j. CCS class 2 chest pain/NYHA class II dyspnea December 2016  k. Left heart catheterization, 1/5/2017; no evidence of hemodynamically significant coronary artery disease, mild nonobstructive plaque in the LAD and first diagonal noted, LVEF 0.55, arterial hypertension, otherwise normal hemodynamics, medical therapy and risk factor management recommended  l. Residual class I symptoms, October 2019, May 2020.  11. Intermittent tinnitus, AD, recurrent, October 2013.   12. Remote abnormal uterine bleeding with apparent abnormal uterine biopsy with apparent uterine cancer, stage and type unknown-data deficit, summer 2005, with subsequent SEGR/BSO with apparent negative lymph node sampling for well differentiated endometrial carcinoma, September 2005.   13. Chronic tobacco use with abnormal chest x-ray with discontinued tobacco use, autumn 2011.   14. GERD/gastritis:  a. Abnormal EGD, May 2013.   b. Initiation of Dexilant therapy, January 2013, with continuation after EGD, May 2013     No Known Allergies    Current Outpatient Medications:   •  amLODIPine (NORVASC) 10 MG tablet, Take 1 tablet by mouth Daily., Disp: 90 tablet, Rfl: 3  •  aspirin 81 MG EC tablet, Take 81 mg by mouth Daily., Disp: , Rfl:   •  atorvastatin (LIPITOR) 40 MG tablet, Take 1 tablet by mouth Daily. (Patient taking differently: Take 20 mg by mouth Daily.), Disp: 90 tablet, Rfl: 3  •  bumetanide (BUMEX) 1 MG tablet, Take 1 tablet by mouth 2 (Two) Times a Day., Disp: 180 tablet, Rfl: 3  •  clopidogrel (PLAVIX) 75 MG tablet, Take 1 tablet by mouth Daily., Disp: 90 tablet, Rfl: 3  •  diclofenac (VOLTAREN) 1 % gel gel, apply 4  "grams to affected area four times a day ( APPLY TO THE KNEE,ANKEL, AND FOOT, Disp: , Rfl: 0  •  isosorbide mononitrate (IMDUR) 120 MG 24 hr tablet, Take 1 tablet by mouth Daily., Disp: 90 tablet, Rfl: 3  •  JANUVIA 50 MG tablet, 50 mg Daily., Disp: , Rfl: 0  •  levothyroxine (SYNTHROID, LEVOTHROID) 150 MCG tablet, Take 150 mcg by mouth Daily., Disp: , Rfl: 0  •  LORazepam (ATIVAN) 0.5 MG tablet, Take 0.5 mg by mouth At Night As Needed., Disp: , Rfl: 0  •  lubiprostone (AMITIZA) 24 MCG capsule, Take 24 mcg by mouth 2 (Two) Times a Day With Meals., Disp: , Rfl:   •  Mometasone Furo-Formoterol Fum (DULERA IN), Inhale 1 puff Daily As Needed (100mcg/5mcg)., Disp: , Rfl:   •  nebivolol (BYSTOLIC) 5 MG tablet, Take 1 tablet by mouth Daily., Disp: 90 tablet, Rfl: 3  •  nitroglycerin (NITROSTAT) 0.4 MG SL tablet, Place 1 tablet under the tongue Every 5 (Five) Minutes As Needed for Chest Pain. Take no more than 3 doses in 15 minutes., Disp: 25 tablet, Rfl: 6    History of Present Illness: Patient is followed up via telephone for her requested followup visit after a 6-1/2 month hiatus.  She has done well since last being seen in our office.  She has been sheltering in place during the recent COVID-19 restrictions.  Her blood pressure is elevated today, and she says that she is unaware of it being high.  She just bought a blood pressure monitor and used it for the first time today; she has taken her blood pressure medicines today.  She had labs in October 2019 indicating her TSH was elevated, and she says that her thyroid medicine has been adjusted.  She notes that the levels have not been rechecked since that time.  She has not had to use nitroglycerin.  She states that she has sharp chest pains when her blood sugar gets \"too low,\" which is \"115\" ; it goes away on its own.. She tries to walk some outside when the weather is nice and has no limitations with that.  Patient otherwise denies anginal-type chest pain, shortness of " "breath, PND, edema, palpitations, syncope, or presyncope at this time.  She has had no interim ER visits, hospitalizations, serious illnesses, or surgeries.      ROS   Obtained and negative except as outlined in problem list and HPI.    Procedures       Objective:       Vitals:    05/07/20 1339   BP: 170/65   BP Location: Left arm   Patient Position: Sitting   Pulse: 65   Weight: 88.5 kg (195 lb)   Height: 167.6 cm (66\")     Body mass index is 31.47 kg/m².   Last weight:  195 lbs.    Physical Exam Physical exam not able to be performed due to telephone visit due to coronavirus.      Lab Review:   10/11/2019 (reviewed with patient by letter):  · CMP - normal electrolytes, serum creatinine 1.4, BUN 24, normal serum calcium, glucose 151 mg/dL, normal liver function tests, serum albumin 4.4  · FLP - total cholesterol 156, triglycerides 103, HDL-C 66, LDL cholesterol 88  · TSH - 28.18  · CBC - hematocrit 40%, hemoglobin 12.7, WBC 4870, acceptable indices, platelet count, and differential  · ESR (April 2019) - elevated  · D-dimer (April 2019) - 1.29    Lab Results   Component Value Date    GLUCOSE 100 01/05/2017    BUN 28 (H) 01/05/2017    CREATININE 1.30 01/05/2017    EGFRIFNONA 40 (L) 01/05/2017    BCR 21.5 01/05/2017    CO2 24.0 01/05/2017    CALCIUM 10.4 01/05/2017    ALBUMIN 4.30 01/05/2017    AST 22 01/05/2017    ALT 14 01/05/2017       Lab Results   Component Value Date    WBC 5.82 01/05/2017    HGB 11.6 01/05/2017    HCT 35.5 01/05/2017    MCV 94.7 01/05/2017     (L) 01/05/2017       Lab Results   Component Value Date    HGBA1C 6.00 (H) 01/05/2017       Lab Results   Component Value Date    TSH 1.078 10/16/2014       Lab Results   Component Value Date    CHOL 154 01/05/2017     Lab Results   Component Value Date    TRIG 83 01/05/2017    TRIG 54 10/16/2014     Lab Results   Component Value Date    HDL 69 (H) 01/05/2017    HDL 54 10/16/2014     This patient has consented to a telehealth visit via telephone. " The visit was scheduled as a telephone visit to comply with patient safety concerns in accordance with CDC recommendations.  All vitals recorded within this visit are reported by the patient.  I spent 25 minutes in total including but not limited to the 10 minutes spent in direct conversation with this patient.           Assessment:       Overall continued acceptable course with no new interim cardiopulmonary complaints with good functional status. We will defer additional diagnostic or therapeutic intervention from a cardiac perspective at this time.  Hopefully, we will be allowed to review any upcoming laboratory results with her by letter.     Diagnosis Plan   1. Severe hypertension  Suboptimal control; see below   2. PAF (paroxysmal atrial fibrillation) (CMS/[facility])  No documented recurrence   3. PVD (peripheral vascular disease) (CMS/[facility])  No current symptoms of TIA or claudication; discontinue Plavix at this time to reduce risk of recurrent bleeding   4. Ischemic heart disease  No recurrent angina pectoris or CHF on current activity schedule; continue current treatment     5. Dyslipidemia  No data to review; continue heart healthy diet and atorvastatin   6. Diabetes mellitus type 2, noninsulin dependent (CMS/[facility])  No data to review; consider Jardiance or Farxiga if hemoglobin A1c is elevated          Plan:         1. Patient to continue current medications and close follow up with the above providers other than to consume and discontinue Plavix; she is to continue her 81 mg aspirin daily.  2. Patient is to check her blood pressure 1-2 times daily and then call our office with those results in 2 weeks.  3. Tentative cardiology follow up in October 2020, or patient may return sooner PRN.    Transcribed by Keeley Monroe for Dr. Alexy Blake at 1:48 PM on 05/07/2020    IAlexy MD, [facility], personally performed the services described in this documentation as scribed by the above named individual in my  presence, and it is both accurate and complete. At 1:53 PM on 05/07/2020

## 2020-05-20 ENCOUNTER — TELEPHONE (OUTPATIENT)
Dept: CARDIOLOGY | Facility: CLINIC | Age: 80
End: 2020-05-20

## 2020-05-20 NOTE — TELEPHONE ENCOUNTER
Pt called scheduling and stated she needed to be seen in 2 weeks per Dr. Hutchinson after being seen at [facility] for chest pain and BP.     Called pt for more information about where she was seen to get records.    Pt did not answer, LVM.

## 2020-09-25 ENCOUNTER — TELEPHONE (OUTPATIENT)
Dept: CARDIOLOGY | Facility: CLINIC | Age: 80
End: 2020-09-25

## 2020-09-25 NOTE — TELEPHONE ENCOUNTER
Patient's son called to have pt's appt moved up due to elevated BP. Patient has appt 9/30/20 11:45.     Called patient, she states that her BP is in the 160s systolic the past couple mornings, with headache and dizziness. After patient takes morning BP medications, systolic decreases to 130s and headache and dizziness go away.     Pt denies chest pain, SOB, current dizziness, blurred vision.     Advised pt to monitor BP/HR twice daily and bring log with her to appt on 9/30/20. Pt verbalizes understanding and agreeable to plan.

## 2020-09-29 NOTE — PROGRESS NOTES
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     CURRENT HEALTHCARE PROVIDER:  SAMUEL Wayne  FORMER PHYSICIAN: James Mcfarlane DO   GASTROENTEROLOGIST: Tanner Rdz MD   ENDOCRINOLOGIST: Antonio Barrera MD   INTERVENTIONAL CARDIOLOGIST:  Vic Marks MD, [facility], [facility]    Chief Complaint:   Chief Complaint   Patient presents with   • Hypertension     f/u       Problem List:  1. Remote progressive severe hypertension, 1997:  a. Acceptable renal nuclear blood flow scan/renal ultrasound/abnormal acceptable abdominal CT scan, April 1997.   b. Recurrent progressive hypertensive blood pressure readings/acceptable selective bilateral renal arteriography, May 2011.   c. Remote acceptable blood pressure control with recent progressive hypertensive blood pressure readings and apparent abnormal renal function with Nephrology referral recommended - data deficit, summer-autumn 2019.  2. Intermittent paroxysmal atrial fibrillation, December 2009, with subsequent chronic oral anticoagulation x1 year.   3. Noninsulin dependent type 2 diabetes mellitus (hemoglobin A1c 5.8%), July 2011.   4. Remote hiatal hernia/probable GERD syndrome with remote EGD, April 1997.   5. Remote peptic ulcer disease with recurrent intermittent gastritis.   6. Osteoarthritis, predominantly involving the knees.   7. Peripheral vascular disease:  a. Asymptomatic bilateral carotid bruits with remote acceptable carotid duplex studies, April 1997.  b. Remote abnormal carotid duplex study with moderate right internal carotid artery stenosis (40% to 60%) with mild left internal carotid artery stenosis (20% to 40%), July 2011.   c. Abnormal carotid duplex study with bilateral moderate carotid artery atherosclerotic plaque/stenoses, January 2015, without focal motor-sensory changes.   8. Remote additional operations:  a. Bilateral benign breast cyst  removal, 1985.   b. 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   c. Remote colon polypectomy with incidental findings of diverticulosis, August 1995.   9. Chronic hypothyroidism/replacement therapy with elevated serum TSH, October 2019.  10. 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 Intermittent exertional dyspnea/chest pain syndrome/probable hypertensive cardiovascular disease:  b. Remote borderline abnormal IV Adenosine Quantitative SPECT Thallium 201 study, April 1997. Remote progressive CCS class III-IV chest pain syndrome with mild nonobstructive coronary atherosclerosis and mild reduction in LV function (LVEF 0.52), May 2001, with subsequent abnormal EGD demonstrating gastritis/treatment with resolution of symptoms.   c. Acceptable combination Doppler echocardiogram with mild concentric LVH, LVEF (0.55), March 2006.   d. Remote prolonged chest pain syndrome with observational stay ([facility]), June 2011, with subsequent diagnostic coronary angiography demonstrating moderate branch vessel coronary artery disease with acceptable flow wire assessment and nominal LV function, LVEF (0.60) with continued medical therapy felt warranted, July 2011.   e. Probable residual CCS class I angina pectoris with recent progressive atypical chest pain syndrome/normal EKG, August 2012, January 2013, May 2013.   f. Abnormal hybrid PET cardiac scan demonstrating small area of reversible LAD ischemia with prominent diffuse ischemic ST-T changes and acceptable preserved systolic function, 06/04/2013, with patient refusing further intervention with diagnostic coronary angiography 06/26/2013.  g. Echocardiogram showing an estimated EF of 0.45 to 0.50 with moderate tricuspid regurgitation and mild concentric left ventricular hypertrophy, 10/17/2014.   h. Non-ST elevation MI with left heart catheterization showing nonobstructive single vessel coronary artery disease, 50%  ostial stenosis of the first diagonal branch, left anterior descending coronary artery, otherwise, no hemodynamically significant coronary artery disease and estimated EF of 0.35 with features of Takotsubo cardiomyopathy, 10/15/2014, by Dr. Marks.   i. Residual class I symptoms with acceptable echocardiogram, February 2016.  j. CCS class II chest pain/NYHA class II dyspnea December 2016  k. Left heart catheterization, 1/5/2017; no evidence of hemodynamically significant coronary artery disease, mild nonobstructive plaque in the LAD and first diagonal noted, LVEF 0.55, arterial hypertension, otherwise normal hemodynamics, medical therapy and risk factor management recommended  l. Residual class I symptoms, October 2019, May 2020, CCS class II chest discomfort/NYHA class I-II SOB symptoms, September 2020.  11. Intermittent tinnitus, AD, recurrent, October 2013.   12. Remote abnormal uterine bleeding with apparent abnormal uterine biopsy with apparent uterine cancer, stage and type unknown-data deficit, summer 2005, with subsequent SERG/BSO with apparent negative lymph node sampling for well differentiated endometrial carcinoma, September 2005.   13. Chronic tobacco use with abnormal chest x-ray with discontinued tobacco use, autumn 2011.   14. GERD/gastritis:  m. Abnormal EGD, May 2013.   n. Initiation of Dexilant therapy, January 2013, with continuation after EGD, May 2013    No Known Allergies    Current Outpatient Medications:   •  amLODIPine (NORVASC) 10 MG tablet, Take 1 tablet by mouth Daily., Disp: 90 tablet, Rfl: 3  •  aspirin 81 MG EC tablet, Take 81 mg by mouth Daily., Disp: , Rfl:   •  atorvastatin (LIPITOR) 40 MG tablet, Take 1 tablet by mouth Daily. (Patient taking differently: Take 20 mg by mouth Daily.), Disp: 90 tablet, Rfl: 3  •  bumetanide (BUMEX) 1 MG tablet, Take 1 tablet by mouth 2 (Two) Times a Day., Disp: 180 tablet, Rfl: 3  •  clopidogrel (PLAVIX) 75 MG tablet, Take 1 tablet by mouth Daily.,  Disp: 90 tablet, Rfl: 3  •  diclofenac (VOLTAREN) 1 % gel gel, apply 4 grams to affected area four times a day ( APPLY TO THE KNEE,ANKEL, AND FOOT, Disp: , Rfl: 0  •  isosorbide mononitrate (IMDUR) 120 MG 24 hr tablet, Take 1 tablet by mouth Daily., Disp: 90 tablet, Rfl: 3  •  JANUVIA 50 MG tablet, 50 mg Daily., Disp: , Rfl: 0  •  levothyroxine (SYNTHROID, LEVOTHROID) 150 MCG tablet, Take 150 mcg by mouth Daily., Disp: , Rfl: 0  •  lubiprostone (AMITIZA) 24 MCG capsule, Take 24 mcg by mouth 2 (Two) Times a Day With Meals., Disp: , Rfl:   •  Mometasone Furo-Formoterol Fum (DULERA IN), Inhale 1 puff Daily As Needed (100mcg/5mcg)., Disp: , Rfl:   •  nebivolol (BYSTOLIC) 5 MG tablet, Take 1 tablet by mouth Daily., Disp: 90 tablet, Rfl: 3  •  nitroglycerin (NITROSTAT) 0.4 MG SL tablet, Place 1 tablet under the tongue Every 5 (Five) Minutes As Needed for Chest Pain. Take no more than 3 doses in 15 minutes., Disp: 25 tablet, Rfl: 6    History of Present Illness: Patient returns early for follow up after a 4-month hiatus. Son called on 09/25/2020 and reported elevated blood pressure readings in the 160's torr systolic with associated headache and dizziness. After patient takes morning medications, her systolic blood pressure decreases to 130s torr and headache and dizziness subside.  She brought her blood pressure log from the past few days with her for us to review and her blood pressures at night are in the 140s torr and during the day and they are in the 120s torr systolic.  She will sometimes have chest pain with no other symptoms that is in the upper chest and beneath her lower breast that she describes as pain or pressure.  She had one episode recently when she had the chest discomfort for an entire day.  She took 3 nitroglycerin without relief.  She did not think to try any Tums and eventually the chest discomfort would resolve.  She sometimes has GERD.  The patient has noticed a little more shortness of breath in  "the past few weeks and denies any orthopnea but occasionally will have some clear sputum production.  She denies any palpitations, presyncope, syncope, or edema.  She is going to have new laboratory testing soon with her physician and she will have the results sent to Dr. Blake for review. She has had no interim ER visits, hospitalizations, serious illnesses, or surgeries.         Review of Systems   Cardiovascular: Positive for chest pain.   Respiratory: Positive for shortness of breath and sputum production.       Obtained and negative except as outlined in problem list and HPI.    Procedures       Objective:       Vitals:    09/30/20 1113 09/30/20 1116   BP: 149/57 136/66   BP Location: Left arm Left arm   Patient Position: Sitting Standing   Pulse: 52 55   Weight: 87.4 kg (192 lb 9.6 oz)    Height: 167.6 cm (66\")    Recheck blood pressure right arm sitting was 128/46  Body mass index is 31.09 kg/m².  Last weight: 195 lbs    Vitals signs reviewed.   Constitutional:       Appearance: Well-developed.   Neck:      Thyroid: No thyromegaly.      Vascular: Carotid bruit (right) present. No JVD.      Lymphadenopathy: No cervical adenopathy.   Pulmonary:      Effort: Pulmonary effort is normal.      Breath sounds: Decreased breath sounds present. No wheezing. No rhonchi. No rales.   Cardiovascular:      Regular rhythm.      Murmurs: There is a grade 2/6 mid frequency harsh early systolic murmur at the URSB.      No gallop. No S3 gallop.   Pulses:     Dorsalis pedis: 2+ bilaterally.     Posterior tibial: 2+ bilaterally.  Edema:     Peripheral edema absent.   Abdominal:      Palpations: Abdomen is soft. There is no abdominal mass.      Tenderness: There is no abdominal tenderness. There is no guarding.   Musculoskeletal: Normal range of motion.   Skin:     General: Skin is warm and dry.      Findings: No rash.   Neurological:      Mental Status: Alert and oriented to person, place, and time.           Lab Review: No " recent lab results available for review.      Lab Results   Component Value Date    GLUCOSE 100 01/05/2017    BUN 28 (H) 01/05/2017    CREATININE 1.30 01/05/2017    EGFRIFNONA 40 (L) 01/05/2017    BCR 21.5 01/05/2017     01/05/2017    K 4.2 01/05/2017     01/05/2017    MG 2.6 10/17/2014    CO2 24.0 01/05/2017    CALCIUM 10.4 01/05/2017    ALBUMIN 4.30 01/05/2017    AST 22 01/05/2017    ALT 14 01/05/2017       Lab Results   Component Value Date    WBC 5.82 01/05/2017    HGB 11.6 01/05/2017    HCT 35.5 01/05/2017    MCV 94.7 01/05/2017     (L) 01/05/2017       Lab Results   Component Value Date    HGBA1C 6.00 (H) 01/05/2017       Lab Results   Component Value Date    TSH 1.078 10/16/2014       Lab Results   Component Value Date    CHOL 154 01/05/2017     Lab Results   Component Value Date    TRIG 83 01/05/2017    TRIG 54 10/16/2014     Lab Results   Component Value Date    HDL 69 (H) 01/05/2017    HDL 54 10/16/2014     Lab Results   Component Value Date    LDL 59 01/05/2017    LDL 50 10/16/2014             Assessment:       Overall continued acceptable course with no new interim cardiopulmonary complaints with acceptable functional status. We will defer additional diagnostic or therapeutic intervention from a cardiac perspective at this time.  We will add telmisartan 40 mg daily and have the patient monitor her blood pressure and call us in 2 weeks with her blood pressure readings.  She will discontinue aspirin but continue Plavix.  We will provide her with Pepcid AC 20 mg twice daily as needed for chest discomfort.     Diagnosis Plan   1. Ischemic heart disease   Telmisartan 40 mg daily, patient to monitor blood pressure at home and call us in 2 weeks with blood pressure readings, discontinue aspirin, continue Plavix   2. Severe hypertension  telmisartan 40 mg daily, patient to monitor blood pressure at home and call us in 2 weeks with blood pressure readings   3. PAF (paroxysmal atrial  fibrillation) (CMS/HCC)   Stable, no recurrent palpitations   4. Dyslipidemia   No new labs to review, continue atorvastatin          Plan:         1. Patient to continue current medications and close follow up with the above providers with the following medication changes:  A.  Initiate Telmisartan 40 mg daily  C.  Discontinue aspirin  D.  Continue Plavix  E.  Initiate Pepcid AC 20 mg twice daily as needed  2. Tentative cardiology follow up in January 2021 or patient may return sooner PRN.  3. Patient will monitor her blood pressure next 2 weeks and call us with the blood pressure readings  4. 1 800 card issued to hopefully review upcoming laboratory studies.  5. Influenza immunization encouraged.     Scribed for Alexy Blake MD by Charo Gould, APRN. 9/30/2020  13:00 EDT    I, Alexy Blake MD, [facility], personally performed the services described in this documentation as scribed by the above named individual in my presence, and it is both accurate and complete. At 13:01 EDT on 09/30/2020

## 2020-09-30 ENCOUNTER — OFFICE VISIT (OUTPATIENT)
Dept: CARDIOLOGY | Facility: CLINIC | Age: 80
End: 2020-09-30

## 2020-09-30 VITALS
HEIGHT: 66 IN | WEIGHT: 192.6 LBS | DIASTOLIC BLOOD PRESSURE: 66 MMHG | HEART RATE: 55 BPM | SYSTOLIC BLOOD PRESSURE: 136 MMHG | BODY MASS INDEX: 30.95 KG/M2

## 2020-09-30 DIAGNOSIS — I25.9 ISCHEMIC HEART DISEASE: Primary | ICD-10-CM

## 2020-09-30 DIAGNOSIS — E78.5 DYSLIPIDEMIA: ICD-10-CM

## 2020-09-30 DIAGNOSIS — I48.0 PAF (PAROXYSMAL ATRIAL FIBRILLATION) (HCC): ICD-10-CM

## 2020-09-30 DIAGNOSIS — I10 SEVERE HYPERTENSION: ICD-10-CM

## 2020-09-30 PROCEDURE — 99214 OFFICE O/P EST MOD 30 MIN: CPT | Performed by: INTERNAL MEDICINE

## 2020-09-30 RX ORDER — FAMOTIDINE 20 MG/1
20 TABLET, FILM COATED ORAL 2 TIMES DAILY PRN
Qty: 80 TABLET | Refills: 0 | Status: SHIPPED | OUTPATIENT
Start: 2020-09-30 | End: 2021-07-30

## 2020-09-30 RX ORDER — TELMISARTAN 40 MG/1
40 TABLET ORAL DAILY
Qty: 90 TABLET | Refills: 3 | Status: SHIPPED | OUTPATIENT
Start: 2020-09-30 | End: 2020-10-23 | Stop reason: DRUGHIGH

## 2020-10-01 DIAGNOSIS — Z00.00 HEALTH CARE MAINTENANCE: ICD-10-CM

## 2020-10-01 RX ORDER — ISOSORBIDE MONONITRATE 120 MG/1
120 TABLET, EXTENDED RELEASE ORAL DAILY
Qty: 90 TABLET | Refills: 3 | Status: SHIPPED | OUTPATIENT
Start: 2020-10-01 | End: 2021-09-23

## 2020-10-01 RX ORDER — BUMETANIDE 1 MG/1
1 TABLET ORAL 2 TIMES DAILY
Qty: 180 TABLET | Refills: 1 | Status: SHIPPED | OUTPATIENT
Start: 2020-10-01 | End: 2021-08-02

## 2020-10-01 RX ORDER — CLOPIDOGREL BISULFATE 75 MG/1
75 TABLET ORAL DAILY
Qty: 90 TABLET | Refills: 3 | Status: SHIPPED | OUTPATIENT
Start: 2020-10-01 | End: 2022-07-29 | Stop reason: SDUPTHER

## 2020-10-01 RX ORDER — NEBIVOLOL 5 MG/1
5 TABLET ORAL DAILY
Qty: 90 TABLET | Refills: 3 | Status: SHIPPED | OUTPATIENT
Start: 2020-10-01 | End: 2021-07-30

## 2020-10-01 RX ORDER — AMLODIPINE BESYLATE 10 MG/1
10 TABLET ORAL DAILY
Qty: 90 TABLET | Refills: 3 | Status: SHIPPED | OUTPATIENT
Start: 2020-10-01 | End: 2021-09-23

## 2020-10-01 RX ORDER — NITROGLYCERIN 0.4 MG/1
0.4 TABLET SUBLINGUAL
Qty: 25 TABLET | Refills: 6 | Status: SHIPPED | OUTPATIENT
Start: 2020-10-01 | End: 2022-07-29 | Stop reason: SDUPTHER

## 2020-10-13 ENCOUNTER — TELEPHONE (OUTPATIENT)
Dept: CARDIOLOGY | Facility: CLINIC | Age: 80
End: 2020-10-13

## 2020-10-13 NOTE — TELEPHONE ENCOUNTER
"Returned pts daughter call regarding her going to the hospital. Daughter states \"they got it all under control now\". She said they had not got her Telmisartan 40 mg yet from her LOV with [facility] and her SBP was over 200 so they called an ambulance. She states ER doctor got in touch with [facility] and was able to get her started on her medication. No further questions at this time.   "

## 2020-10-22 NOTE — TELEPHONE ENCOUNTER
Noted; would increase telmisartan to 80 mg daily and monitor blood pressure 1-2 times daily and update us in 1 week.  Would continue to hold nebivolol at this time.    Thanks!

## 2020-10-22 NOTE — TELEPHONE ENCOUNTER
Pt called to update BP readings.     10/22  119/64  81  10/21  137/52  63   174/66  60  10/20  162/48  66   152/51  62  10/19  172/59  65   152/59  66  10/18  162/61  46   142/52  60  10/17  138/54  61   122/52  75  10/16  105/53  81   121/52  73  10/15  117/53  71   130/59  72  10/14  125/56  68         Pt currently taking:  Amlodipine 10 mg daily   Bumex 1 mg BID  Telmisartan 40 mg daily       Patient stopped Bystolic 10/1 due to confusion with which medications she was instructed to stop at LOV 9/30. Pt was instructed to stop ASA only and add telmisartan 40 mg, which wasn't picked up from pharmacy until 10/14/20. Pt had to go to [facility] ER in [facility] due to systolic BP in 200s. Pt started telmisartan 40 mg after ER visit, still not taking Bystolic.     Please advise.

## 2020-10-23 RX ORDER — TELMISARTAN 80 MG/1
80 TABLET ORAL DAILY
Qty: 90 TABLET | Refills: 3 | Status: SHIPPED | OUTPATIENT
Start: 2020-10-23 | End: 2022-01-03

## 2020-10-23 NOTE — TELEPHONE ENCOUNTER
Patient called and stated that she took telmisartan 80 mg daily at 9 am, along with Plavix 75 mg, Bumex 1 mg, Imdur 120 mg and at 10a her BP was 60/50 . Patient took nitro 0.4 mg SL tablet, due to dizziness, and this helped. At 12:38 154/125  93    Advised pt to take monitor to store and check for accuracy due to inconsistent BP readings. Patient states that she feels fine.    Per [facility]- pt can divide telmisartan 80 mg in to 40 mg BID, monitor BP through weekend and call Monday with updated BP readings.     Gave pt [facility] recommendations above. Pt verbalizes understanding and agreeable to plan.

## 2020-10-29 NOTE — TELEPHONE ENCOUNTER
"Pt called, LVM. Returned pt's call. Phone would ring, , then disconnect without being able to hear the patient. I tried to call three times. I called the patient's son, spoke to him and let him know that I was unable to get a hold of his mom, he stated that they have had issues with her phone line. He stated the pt's BP had been \"better\" the past couple days. Pt had not felt dizzy. Pt's son stated that he would have patient call us back.   "

## 2021-02-05 NOTE — TELEPHONE ENCOUNTER
Patient states that BP got up 220/80 77 last night. Pt took 2 nitro tablets and BP came down to 132/50. This morning /60, pt took one nitro and BP came down to 132/60.     Pt states that 120-132/50s since October 2020.      Pt currently taking:  Amlodipine 10 mg daily   Bumex 1 mg BID  Telmisartan 40 mg daily   Imdur 120 mg daily    Pt denies chest pain, SOB, dizziness, palpitations.    Please advise.

## 2021-02-05 NOTE — TELEPHONE ENCOUNTER
Perplexed why her blood pressure goes up like that.  Would alter telmisartan to 80 mg daily and have her monitor her blood pressure twice daily in the morning and evening with a heart rate and update us next week concerning these readings.    Thanks!

## 2021-02-12 RX ORDER — BISOPROLOL FUMARATE AND HYDROCHLOROTHIAZIDE 2.5; 6.25 MG/1; MG/1
1 TABLET ORAL DAILY
Qty: 90 TABLET | Refills: 3 | Status: SHIPPED | OUTPATIENT
Start: 2021-02-12 | End: 2022-02-14

## 2021-02-12 NOTE — TELEPHONE ENCOUNTER
Pt called to update BP/HR readings.    2/6 193/66 66  232/63 67  2/7 137/60 66  140/64 76  2/8 174/65 66    2/9 132/63 67  2/10  114/58 75  2/11  174/65 66  2/12 108/59 71    Pt denies swelling. Pt reports dizziness, and palpitations caused by low blood sugar. Pt reports SOB with activity relieved with inhaler. Pt reports chest pain that seems to correlate with low blood sugar.     Pt currently taking:  Amlodipine 10 mg daily   Bumex 1 mg BID  Telmisartan 80 mg daily   Imdur 120 mg daily    Please advise.

## 2021-02-12 NOTE — TELEPHONE ENCOUNTER
Noted; would empirically add bisoprolol/HCTZ 2.5/6.25 daily and update us again in 1 to 2 weeks concerning blood pressure and heart rate readings.  Who is managing her glucose?  She needs to touch base with them to ensure that her episodes of hypoglycemia are not recurrent and frequent.    Thanks!

## 2021-02-26 ENCOUNTER — OFFICE VISIT (OUTPATIENT)
Dept: CARDIOLOGY | Facility: CLINIC | Age: 81
End: 2021-02-26

## 2021-02-26 VITALS
BODY MASS INDEX: 31.02 KG/M2 | SYSTOLIC BLOOD PRESSURE: 109 MMHG | DIASTOLIC BLOOD PRESSURE: 50 MMHG | HEART RATE: 64 BPM | HEIGHT: 66 IN | WEIGHT: 193 LBS

## 2021-02-26 DIAGNOSIS — E78.5 DYSLIPIDEMIA: ICD-10-CM

## 2021-02-26 DIAGNOSIS — I25.9 ISCHEMIC HEART DISEASE: Primary | ICD-10-CM

## 2021-02-26 DIAGNOSIS — I48.0 PAF (PAROXYSMAL ATRIAL FIBRILLATION) (HCC): ICD-10-CM

## 2021-02-26 DIAGNOSIS — I10 SEVERE HYPERTENSION: ICD-10-CM

## 2021-02-26 PROCEDURE — 99214 OFFICE O/P EST MOD 30 MIN: CPT | Performed by: INTERNAL MEDICINE

## 2021-02-26 NOTE — PROGRESS NOTES
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     CURRENT HEALTHCARE PROVIDER:  SAMUEL Wayne  FORMER PHYSICIAN: James Mcfarlane DO   GASTROENTEROLOGIST: Tanner Rdz MD   ENDOCRINOLOGIST: Antonio Barrera MD   INTERVENTIONAL CARDIOLOGIST:  Vic Marks MD, [facility]  NEPHROLOGIST: Loree Tong MD    Chief Complaint:   Chief Complaint   Patient presents with   • Ischemic heart disease     f/u       Problem List:  1. Remote progressive severe hypertension, 1997:  a. Acceptable renal nuclear blood flow scan/renal ultrasound/abnormal acceptable abdominal CT scan, April 1997.   b. Recurrent progressive hypertensive blood pressure readings/acceptable selective bilateral renal arteriography, May 2011.   c. Remote acceptable blood pressure control with recent progressive hypertensive blood pressure readings and apparent abnormal renal function with Nephrology referral recommended - data deficit, summer-autumn 2019.  2. Intermittent paroxysmal atrial fibrillation, December 2009, with subsequent chronic oral anticoagulation x1 year.   3. Noninsulin dependent type 2 diabetes mellitus (hemoglobin A1c 5.8%), July 2011.  4. Stage 3 chronic kidney disease - data deficit, 2020   5. Remote hiatal hernia/probable GERD syndrome with remote EGD, April 1997.   6. Remote peptic ulcer disease with recurrent intermittent gastritis.   7. Osteoarthritis, predominantly involving the knees.   8. Peripheral vascular disease:  a. Asymptomatic bilateral carotid bruits with remote acceptable carotid duplex studies, April 1997.  b. Remote abnormal carotid duplex study with moderate right internal carotid artery stenosis (40% to 60%) with mild left internal carotid artery stenosis (20% to 40%), July 2011.   c. Abnormal carotid duplex study with bilateral moderate carotid artery atherosclerotic plaque/stenoses, January 2015, without  focal motor-sensory changes.   9. Remote additional operations:  a. Bilateral benign breast cyst removal, 1985.   b. 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   c. Remote colon polypectomy with incidental findings of diverticulosis, August 1995.   10. Chronic hypothyroidism/replacement therapy with elevated serum TSH, October 2019.  11. 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 Intermittent exertional dyspnea/chest pain syndrome/probable hypertensive cardiovascular disease:  b. Remote borderline abnormal IV Adenosine Quantitative SPECT Thallium 201 study, April 1997. Remote progressive CCS class III-IV chest pain syndrome with mild nonobstructive coronary atherosclerosis and mild reduction in LV function (LVEF 0.52), May 2001, with subsequent abnormal EGD demonstrating gastritis/treatment with resolution of symptoms.   c. Acceptable combination Doppler echocardiogram with mild concentric LVH, LVEF (0.55), March 2006.   d. Remote prolonged chest pain syndrome with observational stay ([facility]), June 2011, with subsequent diagnostic coronary angiography demonstrating moderate branch vessel coronary artery disease with acceptable flow wire assessment and nominal LV function, LVEF (0.60) with continued medical therapy felt warranted, July 2011.   e. Probable residual CCS class I angina pectoris with recent progressive atypical chest pain syndrome/normal EKG, August 2012, January 2013, May 2013.   f. Abnormal hybrid PET cardiac scan demonstrating small area of reversible LAD ischemia with prominent diffuse ischemic ST-T changes and acceptable preserved systolic function, 06/04/2013, with patient refusing further intervention with diagnostic coronary angiography 06/26/2013.  g. Echocardiogram showing an estimated EF of 0.45 to 0.50 with moderate tricuspid regurgitation and mild concentric left ventricular hypertrophy, 10/17/2014.   h. Non-ST elevation MI  with left heart catheterization showing nonobstructive single vessel coronary artery disease, 50% ostial stenosis of the first diagonal branch, left anterior descending coronary artery, otherwise, no hemodynamically significant coronary artery disease and estimated EF of 0.35 with features of Takotsubo cardiomyopathy, 10/15/2014, by Dr. Marks.   i. Residual class I symptoms with acceptable echocardiogram, February 2016.  j. CCS class 2 chest pain/NYHA class II dyspnea December 2016  k. Left heart catheterization, 1/5/2017; no evidence of hemodynamically significant coronary artery disease, mild nonobstructive plaque in the LAD and first diagonal noted, LVEF 0.55, arterial hypertension, otherwise normal hemodynamics, medical therapy and risk factor management recommended  l. Residual class I symptoms, October 2019, May 2020.  12. Intermittent tinnitus, AD, recurrent, October 2013.   13. Remote abnormal uterine bleeding with apparent abnormal uterine biopsy with apparent uterine cancer, stage and type unknown-data deficit, summer 2005, with subsequent SERG/BSO with apparent negative lymph node sampling for well differentiated endometrial carcinoma, September 2005.   14. Chronic tobacco use with abnormal chest x-ray with discontinued tobacco use, autumn 2011.   15. GERD/gastritis:  a. Abnormal EGD, May 2013.   b. Initiation of Dexilant therapy, January 2013, with continuation after EGD, May 2013    No Known Allergies    Current Outpatient Medications:   •  amLODIPine (NORVASC) 10 MG tablet, Take 1 tablet by mouth Daily., Disp: 90 tablet, Rfl: 3  •  atorvastatin (LIPITOR) 40 MG tablet, Take 1 tablet by mouth Daily. (Patient taking differently: Take 20 mg by mouth Daily.), Disp: 90 tablet, Rfl: 3  •  bisoprolol-hydrochlorothiazide (ZIAC) 2.5-6.25 MG per tablet, Take 1 tablet by mouth Daily., Disp: 90 tablet, Rfl: 3  •  bumetanide (BUMEX) 1 MG tablet, Take 1 tablet by mouth 2 (Two) Times a Day., Disp: 180 tablet, Rfl: 1  •   clopidogrel (PLAVIX) 75 MG tablet, Take 1 tablet by mouth Daily., Disp: 90 tablet, Rfl: 3  •  diclofenac (VOLTAREN) 1 % gel gel, apply 4 grams to affected area four times a day ( APPLY TO THE KNEE,ANKEL, AND FOOT, Disp: , Rfl: 0  •  famotidine (Pepcid AC Maximum Strength) 20 MG tablet, Take 1 tablet by mouth 2 (Two) Times a Day As Needed for Heartburn., Disp: 80 tablet, Rfl: 0  •  isosorbide mononitrate (IMDUR) 120 MG 24 hr tablet, Take 1 tablet by mouth Daily., Disp: 90 tablet, Rfl: 3  •  JANUVIA 50 MG tablet, 50 mg Daily., Disp: , Rfl: 0  •  levothyroxine (SYNTHROID, LEVOTHROID) 150 MCG tablet, Take 150 mcg by mouth Daily., Disp: , Rfl: 0  •  lubiprostone (AMITIZA) 24 MCG capsule, Take 24 mcg by mouth 2 (Two) Times a Day With Meals., Disp: , Rfl:   •  Mometasone Furo-Formoterol Fum (DULERA IN), Inhale 1 puff Daily As Needed (100mcg/5mcg)., Disp: , Rfl:   •  nebivolol (Bystolic) 5 MG tablet, Take 1 tablet by mouth Daily., Disp: 90 tablet, Rfl: 3  •  nitroglycerin (Nitrostat) 0.4 MG SL tablet, Place 1 tablet under the tongue Every 5 (Five) Minutes As Needed for Chest Pain. Take no more than 3 doses in 15 minutes., Disp: 25 tablet, Rfl: 6  •  telmisartan (MICARDIS) 80 MG tablet, Take 1 tablet by mouth Daily., Disp: 90 tablet, Rfl: 3    History of Present Illness: Patient returns for scheduled 3-month follow up. She has not been feeling well overall. She reports she has difficulty controlling her blood pressure and blood glucose. She uses her nitroglycerin on occasion and this usually alleviates her chest pain quickly. Patient denies palpitations, edema, dizziness, and syncope. She has not received COVID immunization. She lives with her sons who care for her, cooking and cleaning and buying the groceries. They work but are not frequently in contact with the public. She has had no interim ER visits, hospitalizations, serious illnesses, or surgeries. She is following closely with a nephrologist for her stage 3 chronic  "kidney disease - data deficit.        ROS   Obtained and negative except as outlined in problem list and HPI.    Procedures       Objective:       Vitals:    02/26/21 1540 02/26/21 1541   BP: 121/54 109/50   BP Location: Right arm Right arm   Patient Position: Sitting Standing   Pulse: 58 64   Weight: 87.5 kg (193 lb)    Height: 167.6 cm (66\")      Body mass index is 31.15 kg/m².  Last weight: 192 lbs    Vitals signs reviewed.   Constitutional:       Appearance: Well-developed.   Neck:      Thyroid: No thyromegaly.      Vascular: No carotid bruit or JVD.      Lymphadenopathy: No cervical adenopathy.   Pulmonary:      Effort: Pulmonary effort is normal.      Breath sounds: Decreased breath sounds present. No wheezing. No rhonchi. No rales.   Cardiovascular:      Regular rhythm.      Murmurs: There is a grade 2/6 mid frequency early systolic murmur at the LLSB.      No gallop. No S3 gallop.   Pulses:     Dorsalis pedis: 1+ bilaterally.     Posterior tibial: 1+ bilaterally.  Edema:     Peripheral edema absent.   Abdominal:      Palpations: Abdomen is soft. There is no abdominal mass.      Tenderness: There is no abdominal tenderness. There is no guarding.   Musculoskeletal: Normal range of motion.   Skin:     General: Skin is warm and dry.      Findings: No rash.   Neurological:      Mental Status: Alert and oriented to person, place, and time.           Lab Review:     No recent laboratory studies available for review today.        Assessment:       Overall continued acceptable course with no new interim cardiopulmonary complaints with fair functional status. We will defer additional diagnostic or therapeutic intervention from a cardiac perspective at this time. Hopefully we will be able to obtain any upcoming laboratory study results for review with the patient by letter.      Diagnosis Plan   1. Ischemic heart disease  No prolonged angina pectoris or CHF on current activity. Continue current treatment.   2. PAF " (paroxysmal atrial fibrillation) (CMS/HCC)  Stable and asymptomatic. Continue current treatment.   3. Severe hypertension  Well controlled. Continue current treatment.   4. Dyslipidemia  No new data to review. Continue atorvastatin.          Plan:         1. Patient to continue current medications and close follow up with the above providers.  2. Encouraged COVID immunization when available.  3. Tentative cardiology follow up in July 2021 or patient may return sooner PRN.    I, Enmanuel Gates, attest that this documentation has been prepared under the direction and in the presence of Alexy Blake MD 02/26/2021    I, Alexy Blake MD, [facility], personally performed the services described in this documentation as scribed by the above named individual in my presence, and it is both accurate and complete. At 16:06 EST on 02/26/2021

## 2021-07-30 ENCOUNTER — OFFICE VISIT (OUTPATIENT)
Dept: CARDIOLOGY | Facility: CLINIC | Age: 81
End: 2021-07-30

## 2021-07-30 VITALS
BODY MASS INDEX: 31.12 KG/M2 | HEIGHT: 66 IN | OXYGEN SATURATION: 95 % | HEART RATE: 66 BPM | SYSTOLIC BLOOD PRESSURE: 118 MMHG | WEIGHT: 193.6 LBS | DIASTOLIC BLOOD PRESSURE: 42 MMHG

## 2021-07-30 DIAGNOSIS — I10 SEVERE HYPERTENSION: ICD-10-CM

## 2021-07-30 DIAGNOSIS — E78.5 DYSLIPIDEMIA: ICD-10-CM

## 2021-07-30 DIAGNOSIS — I25.9 ISCHEMIC HEART DISEASE: Primary | ICD-10-CM

## 2021-07-30 DIAGNOSIS — I48.0 PAF (PAROXYSMAL ATRIAL FIBRILLATION) (HCC): ICD-10-CM

## 2021-07-30 PROCEDURE — 93000 ELECTROCARDIOGRAM COMPLETE: CPT | Performed by: INTERNAL MEDICINE

## 2021-07-30 PROCEDURE — 99214 OFFICE O/P EST MOD 30 MIN: CPT | Performed by: INTERNAL MEDICINE

## 2021-07-30 RX ORDER — PANTOPRAZOLE SODIUM 40 MG/1
40 TABLET, DELAYED RELEASE ORAL DAILY
Status: ON HOLD | COMMUNITY
End: 2022-06-15

## 2021-07-30 RX ORDER — GLIPIZIDE 5 MG/1
5 TABLET ORAL DAILY
COMMUNITY
End: 2022-02-02

## 2021-07-30 RX ORDER — ATORVASTATIN CALCIUM 20 MG/1
20 TABLET, FILM COATED ORAL DAILY
COMMUNITY
End: 2022-07-29 | Stop reason: SDUPTHER

## 2021-07-30 NOTE — PROGRESS NOTES
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     CURRENT HEALTHCARE PROVIDER:  SAMUEL Wayne  FORMER PHYSICIAN: James Mcfarlane DO   GASTROENTEROLOGIST: Tanner Rdz MD   ENDOCRINOLOGIST: Antonio Barrera MD   INTERVENTIONAL CARDIOLOGIST:  Vic Marks MD, [facility]  NEPHROLOGIST: Loree Tong MD    Chief Complaint:   Chief Complaint   Patient presents with   • Atrial Fibrillation   • Dizziness       Problem List:  1. Remote progressive severe hypertension, 1997:  a. Acceptable renal nuclear blood flow scan/renal ultrasound/abnormal acceptable abdominal CT scan, April 1997.   b. Recurrent progressive hypertensive blood pressure readings/acceptable selective bilateral renal arteriography, May 2011.   c. Remote acceptable blood pressure control with recent progressive hypertensive blood pressure readings and apparent abnormal renal function with Nephrology referral recommended - data deficit, summer-autumn 2019.  2. Intermittent paroxysmal atrial fibrillation, December 2009, with subsequent chronic oral anticoagulation x1 year.   3. Noninsulin dependent type 2 diabetes mellitus (hemoglobin A1c 5.8%), July 2011.  4. Stage 3 chronic kidney disease - data deficit, 2020   5. Remote hiatal hernia/probable GERD syndrome with remote EGD, April 1997.   6. Remote peptic ulcer disease with recurrent intermittent gastritis.   7. Osteoarthritis, predominantly involving the knees.   8. Peripheral vascular disease:  a. Asymptomatic bilateral carotid bruits with remote acceptable carotid duplex studies, April 1997.  b. Remote abnormal carotid duplex study with moderate right internal carotid artery stenosis (40% to 60%) with mild left internal carotid artery stenosis (20% to 40%), July 2011.   c. Abnormal carotid duplex study with bilateral moderate carotid artery atherosclerotic plaque/stenoses, January 2015, without  focal motor-sensory changes.   9. Remote additional operations:  a. Bilateral benign breast cyst removal, 1985.   b. 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   c. Remote colon polypectomy with incidental findings of diverticulosis, August 1995.   10. Chronic hypothyroidism/replacement therapy with elevated serum TSH, October 2019.  11. 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 Intermittent exertional dyspnea/chest pain syndrome/probable hypertensive cardiovascular disease:  b. Remote borderline abnormal IV Adenosine Quantitative SPECT Thallium 201 study, April 1997. Remote progressive CCS class III-IV chest pain syndrome with mild nonobstructive coronary atherosclerosis and mild reduction in LV function (LVEF 0.52), May 2001, with subsequent abnormal EGD demonstrating gastritis/treatment with resolution of symptoms.   c. Acceptable combination Doppler echocardiogram with mild concentric LVH, LVEF (0.55), March 2006.   d. Remote prolonged chest pain syndrome with observational stay ([facility]), June 2011, with subsequent diagnostic coronary angiography demonstrating moderate branch vessel coronary artery disease with acceptable flow wire assessment and nominal LV function, LVEF (0.60) with continued medical therapy felt warranted, July 2011.   e. Probable residual CCS class I angina pectoris with recent progressive atypical chest pain syndrome/normal EKG, August 2012, January 2013, May 2013.   f. Abnormal hybrid PET cardiac scan demonstrating small area of reversible LAD ischemia with prominent diffuse ischemic ST-T changes and acceptable preserved systolic function, 06/04/2013, with patient refusing further intervention with diagnostic coronary angiography 06/26/2013.  g. Echocardiogram showing an estimated EF of 0.45 to 0.50 with moderate tricuspid regurgitation and mild concentric left ventricular hypertrophy, 10/17/2014.   h. Non-ST elevation MI  with left heart catheterization showing nonobstructive single vessel coronary artery disease, 50% ostial stenosis of the first diagonal branch, left anterior descending coronary artery, otherwise, no hemodynamically significant coronary artery disease and estimated EF of 0.35 with features of Takotsubo cardiomyopathy, 10/15/2014, by Dr. Marks.   i. Residual class I symptoms with acceptable echocardiogram, February 2016.  j. CCS class 2 chest pain/NYHA class II dyspnea December 2016  k. Left heart catheterization, 1/5/2017; no evidence of hemodynamically significant coronary artery disease, mild nonobstructive plaque in the LAD and first diagonal noted, LVEF 0.55, arterial hypertension, otherwise normal hemodynamics, medical therapy and risk factor management recommended  l. Residual class I symptoms, October 2019, May 2020, July 2021.  12. Intermittent tinnitus, AD, recurrent, October 2013.   13. Remote abnormal uterine bleeding with apparent abnormal uterine biopsy with apparent uterine cancer, stage and type unknown-data deficit, summer 2005, with subsequent SERG/BSO with apparent negative lymph node sampling for well differentiated endometrial carcinoma, September 2005.   14. Chronic tobacco use with abnormal chest x-ray with discontinued tobacco use, autumn 2011.   15. GERD/gastritis:  a. Abnormal EGD, May 2013.   b. Initiation of Dexilant therapy, January 2013, with continuation after EGD, May 2013    No Known Allergies    Current Outpatient Medications:   •  amLODIPine (NORVASC) 10 MG tablet, Take 1 tablet by mouth Daily., Disp: 90 tablet, Rfl: 3  •  atorvastatin (LIPITOR) 20 MG tablet, Take 20 mg by mouth Daily., Disp: , Rfl:   •  bisoprolol-hydrochlorothiazide (ZIAC) 2.5-6.25 MG per tablet, Take 1 tablet by mouth Daily., Disp: 90 tablet, Rfl: 3  •  bumetanide (BUMEX) 1 MG tablet, Take 1 tablet by mouth 2 (Two) Times a Day., Disp: 180 tablet, Rfl: 1  •  clopidogrel (PLAVIX) 75 MG tablet, Take 1 tablet by mouth  "Daily., Disp: 90 tablet, Rfl: 3  •  glipizide (GLUCOTROL) 5 MG tablet, Take 5 mg by mouth Daily., Disp: , Rfl:   •  isosorbide mononitrate (IMDUR) 120 MG 24 hr tablet, Take 1 tablet by mouth Daily., Disp: 90 tablet, Rfl: 3  •  levothyroxine (SYNTHROID, LEVOTHROID) 150 MCG tablet, Take 150 mcg by mouth Daily., Disp: , Rfl: 0  •  Mometasone Furo-Formoterol Fum (DULERA IN), Inhale 1 puff Daily As Needed (100mcg/5mcg)., Disp: , Rfl:   •  nitroglycerin (Nitrostat) 0.4 MG SL tablet, Place 1 tablet under the tongue Every 5 (Five) Minutes As Needed for Chest Pain. Take no more than 3 doses in 15 minutes., Disp: 25 tablet, Rfl: 6  •  pantoprazole (PROTONIX) 40 MG EC tablet, Take 40 mg by mouth Daily., Disp: , Rfl:   •  telmisartan (MICARDIS) 80 MG tablet, Take 1 tablet by mouth Daily., Disp: 90 tablet, Rfl: 3    History of Present Illness: Patient returns for scheduled 5-month follow up. She has been feeling well overall from a cardiovascular standpoint. Patient denies chest pain, shortness of breath, palpitations, edema, dizziness, and syncope. She is fairly active. She reports she has had labile blood pressure readings. She had an episode in which her blood pressure was 99/50 and she took nitroglycerin and her blood pressure subsequently improved. She takes nitroglycerin approximately once per week only when her blood pressure is high or low. She has associated headache and \"shakes\" when her blood pressure is high or low. She has had no interim ER visits, hospitalizations, serious illnesses, or surgeries. She has not received COVID immunization and declines consideration of immunization. She has had recent laboratory results but is unaware of the results.      ROS   Obtained and negative except as outlined in problem list and HPI.      ECG 12 Lead    Date/Time: 7/30/2021 3:12 PM  Performed by: Alexy Blake MD  Authorized by: Alexy Blake MD   Comparison: compared with previous ECG from 12/23/2016  Comparison to " "previous ECG: Nonspecific ST-T changes noted and PACs noted.  Rhythm: sinus rhythm  Ectopy: atrial premature contractions  BPM: 61  Other findings: non-specific ST-T wave changes    Clinical impression: abnormal EKG  Comments: QRS: 98 ms  QTc: 450 ms  OH: 158 ms               Objective:       Vitals:    07/30/21 1451 07/30/21 1500 07/30/21 1515   BP: 139/45 145/55 118/42   BP Location: Left arm Left arm Left arm   Patient Position: Sitting Standing Sitting   Pulse: 62 66    SpO2: 95%     Weight: 87.8 kg (193 lb 9.6 oz)     Height: 167.6 cm (66\")       Body mass index is 31.25 kg/m².  Last weight: 193 lbs    Vitals reviewed.   Constitutional:       Appearance: Well-developed.   Neck:      Thyroid: No thyromegaly.      Vascular: Carotid bruit (right) present. No JVD.      Lymphadenopathy: No cervical adenopathy.   Pulmonary:      Effort: Pulmonary effort is normal.      Breath sounds: Decreased breath sounds present. No wheezing. No rhonchi. No rales.   Cardiovascular:      Regular rhythm.      Murmurs: There is a grade 1/6 mid frequency midsystolic murmur at the URSB.      No gallop. No S3 gallop.   Pulses:     Carotid: on the right side with bruit.     Dorsalis pedis: 1+ bilaterally.     Posterior tibial: 1+ bilaterally.  Edema:     Peripheral edema absent.   Abdominal:      Palpations: Abdomen is soft. There is no abdominal mass.      Tenderness: There is no abdominal tenderness.   Musculoskeletal: Normal range of motion. Skin:     General: Skin is warm and dry.      Findings: No rash.   Neurological:      Mental Status: Alert and oriented to person, place, and time.           Lab Review:     No recent laboratory studies available for review today.        Assessment:       Overall continued acceptable course with no new interim cardiopulmonary complaints with acceptable functional status. We will defer additional diagnostic or therapeutic intervention from a cardiac perspective at this time. Hopefully we will be " able to obtain any upcoming laboratory study results for review with the patient by letter.       Diagnosis Plan   1. Ischemic heart disease  No recurrent angina pectoris or CHF on current activity. Continue current treatment.   2. PAF (paroxysmal atrial fibrillation) (CMS/HCC)  Asymptomatic with sinus rhythm on EKG in office today. Continue current treatment.   3. Severe hypertension  Acceptable control. Continue current treatment.   4. Dyslipidemia  No data to review. Continue atorvastatin.          Plan:         1. Patient to continue current medications and close follow up with the above providers.  2. COVID immunization encouraged.  3. Tentative cardiology follow up in January 2022 or patient may return sooner PRN.  4. 1 800 card issued.    IEnmanuel, attest that this documentation has been prepared under the direction and in the presence of Alexy Blake MD 07/30/2021    IAlexy MD, [facility], personally performed the services described in this documentation as scribed by the above named individual in my presence, and it is both accurate and complete. At 15:29 EDT on 07/30/2021

## 2021-08-02 RX ORDER — BUMETANIDE 1 MG/1
TABLET ORAL
Qty: 180 TABLET | Refills: 0 | Status: SHIPPED | OUTPATIENT
Start: 2021-08-02 | End: 2022-05-26 | Stop reason: SDUPTHER

## 2021-09-23 RX ORDER — AMLODIPINE BESYLATE 10 MG/1
TABLET ORAL
Qty: 90 TABLET | Refills: 3 | Status: SHIPPED | OUTPATIENT
Start: 2021-09-23 | End: 2022-02-02

## 2021-09-23 RX ORDER — ISOSORBIDE MONONITRATE 120 MG/1
TABLET, EXTENDED RELEASE ORAL
Qty: 90 TABLET | Refills: 3 | Status: SHIPPED | OUTPATIENT
Start: 2021-09-23 | End: 2022-07-29 | Stop reason: SDUPTHER

## 2022-01-03 RX ORDER — TELMISARTAN 80 MG/1
TABLET ORAL
Qty: 90 TABLET | Refills: 3 | Status: SHIPPED | OUTPATIENT
Start: 2022-01-03 | End: 2022-04-20

## 2022-02-02 ENCOUNTER — HOSPITAL ENCOUNTER (OUTPATIENT)
Dept: GENERAL RADIOLOGY | Facility: HOSPITAL | Age: 82
Discharge: HOME | End: 2022-02-02
Admitting: INTERNAL MEDICINE

## 2022-02-02 ENCOUNTER — TRANSCRIBE ORDERS (OUTPATIENT)
Dept: GENERAL RADIOLOGY | Facility: HOSPITAL | Age: 82
End: 2022-02-02

## 2022-02-02 ENCOUNTER — OFFICE VISIT (OUTPATIENT)
Dept: CARDIOLOGY | Facility: CLINIC | Age: 82
End: 2022-02-02

## 2022-02-02 VITALS
SYSTOLIC BLOOD PRESSURE: 132 MMHG | DIASTOLIC BLOOD PRESSURE: 49 MMHG | BODY MASS INDEX: 30.89 KG/M2 | OXYGEN SATURATION: 94 % | WEIGHT: 192.2 LBS | HEIGHT: 66 IN | HEART RATE: 60 BPM

## 2022-02-02 DIAGNOSIS — I48.0 PAF (PAROXYSMAL ATRIAL FIBRILLATION): ICD-10-CM

## 2022-02-02 DIAGNOSIS — E78.5 DYSLIPIDEMIA: ICD-10-CM

## 2022-02-02 DIAGNOSIS — I10 SEVERE HYPERTENSION: ICD-10-CM

## 2022-02-02 DIAGNOSIS — M25.512 LEFT SHOULDER PAIN, UNSPECIFIED CHRONICITY: ICD-10-CM

## 2022-02-02 DIAGNOSIS — I25.9 ISCHEMIC HEART DISEASE: Primary | ICD-10-CM

## 2022-02-02 DIAGNOSIS — M25.512 ACUTE PAIN OF LEFT SHOULDER: ICD-10-CM

## 2022-02-02 DIAGNOSIS — M25.512 LEFT SHOULDER PAIN, UNSPECIFIED CHRONICITY: Primary | ICD-10-CM

## 2022-02-02 PROCEDURE — 73030 X-RAY EXAM OF SHOULDER: CPT

## 2022-02-02 PROCEDURE — 99214 OFFICE O/P EST MOD 30 MIN: CPT | Performed by: INTERNAL MEDICINE

## 2022-02-02 RX ORDER — LUBIPROSTONE 24 UG/1
24 CAPSULE ORAL
COMMUNITY
End: 2022-07-29 | Stop reason: SDUPTHER

## 2022-02-02 RX ORDER — AZELASTINE 1 MG/ML
SPRAY, METERED NASAL
Status: ON HOLD | COMMUNITY
Start: 2021-11-30 | End: 2022-06-15

## 2022-02-02 RX ORDER — LINAGLIPTIN 5 MG/1
5 TABLET, FILM COATED ORAL EVERY MORNING
COMMUNITY
Start: 2021-11-30 | End: 2022-07-29 | Stop reason: SDUPTHER

## 2022-02-02 RX ORDER — CALCITRIOL 0.25 UG/1
0.25 CAPSULE, LIQUID FILLED ORAL DAILY
Status: ON HOLD | COMMUNITY
End: 2022-06-15

## 2022-02-02 RX ORDER — AMLODIPINE BESYLATE 2.5 MG/1
2.5 TABLET ORAL DAILY
Qty: 90 TABLET | Refills: 3 | Status: SHIPPED | OUTPATIENT
Start: 2022-02-02 | End: 2022-05-26 | Stop reason: DRUGHIGH

## 2022-02-02 RX ORDER — BECLOMETHASONE DIPROPIONATE MONOHYDRATE 42 UG/1
SPRAY, SUSPENSION NASAL
Status: ON HOLD | COMMUNITY
Start: 2021-11-30 | End: 2022-06-15

## 2022-02-02 RX ORDER — GLIPIZIDE 5 MG/1
5 TABLET, FILM COATED, EXTENDED RELEASE ORAL DAILY
COMMUNITY
Start: 2021-11-30 | End: 2022-07-29 | Stop reason: SDUPTHER

## 2022-02-02 RX ORDER — AMLODIPINE BESYLATE 5 MG/1
5 TABLET ORAL DAILY
COMMUNITY
End: 2022-02-02 | Stop reason: DRUGHIGH

## 2022-02-02 NOTE — PROGRESS NOTES
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     CURRENT HEALTHCARE PROVIDER:  SAMUEL Wayne  FORMER PHYSICIAN: James Mcfarlane DO   GASTROENTEROLOGIST: Tanner Rdz MD   ENDOCRINOLOGIST: Antonio Barrera MD   INTERVENTIONAL CARDIOLOGIST:  Vic Marks MD, [facility]  NEPHROLOGIST: Loree Tong MD    Chief Complaint:   Chief Complaint   Patient presents with   • Atrial Fibrillation   • Arm and shoulder pain       Problem List:  1. Remote progressive severe hypertension, 1997:  a. Acceptable renal nuclear blood flow scan/renal ultrasound/abnormal acceptable abdominal CT scan, April 1997.   b. Recurrent progressive hypertensive blood pressure readings/acceptable selective bilateral renal arteriography, May 2011.   c. Remote acceptable blood pressure control with recent progressive hypertensive blood pressure readings and apparent abnormal renal function with Nephrology referral recommended - data deficit, summer-autumn 2019.  2. Intermittent paroxysmal atrial fibrillation, December 2009, with subsequent chronic oral anticoagulation x1 year.   3. Noninsulin dependent type 2 diabetes mellitus (hemoglobin A1c 5.8%), July 2011.  4. Stage 3 chronic kidney disease - data deficit, 2020   5. Remote hiatal hernia/probable GERD syndrome with remote EGD, April 1997.   6. Remote peptic ulcer disease with recurrent intermittent gastritis.   7. Osteoarthritis, predominantly involving the knees.   8. Peripheral vascular disease:  a. Asymptomatic bilateral carotid bruits with remote acceptable carotid duplex studies, April 1997.  b. Remote abnormal carotid duplex study with moderate right internal carotid artery stenosis (40% to 60%) with mild left internal carotid artery stenosis (20% to 40%), July 2011.   c. Abnormal carotid duplex study with bilateral moderate carotid artery atherosclerotic plaque/stenoses, January  2015, without focal motor-sensory changes.   9. Remote additional operations:  a. Bilateral benign breast cyst removal, 1985.   b. 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   c. Remote colon polypectomy with incidental findings of diverticulosis, August 1995.   10. Chronic hypothyroidism/replacement therapy with elevated serum TSH, October 2019.  11. 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 Intermittent exertional dyspnea/chest pain syndrome/probable hypertensive cardiovascular disease:  b. Remote borderline abnormal IV Adenosine Quantitative SPECT Thallium 201 study, April 1997. Remote progressive CCS class III-IV chest pain syndrome with mild nonobstructive coronary atherosclerosis and mild reduction in LV function (LVEF 0.52), May 2001, with subsequent abnormal EGD demonstrating gastritis/treatment with resolution of symptoms.   c. Acceptable combination Doppler echocardiogram with mild concentric LVH, LVEF (0.55), March 2006.   d. Remote prolonged chest pain syndrome with observational stay ([facility]), June 2011, with subsequent diagnostic coronary angiography demonstrating moderate branch vessel coronary artery disease with acceptable flow wire assessment and nominal LV function, LVEF (0.60) with continued medical therapy felt warranted, July 2011.   e. Probable residual CCS class I angina pectoris with recent progressive atypical chest pain syndrome/normal EKG, August 2012, January 2013, May 2013.   f. Abnormal hybrid PET cardiac scan demonstrating small area of reversible LAD ischemia with prominent diffuse ischemic ST-T changes and acceptable preserved systolic function, 06/04/2013, with patient refusing further intervention with diagnostic coronary angiography 06/26/2013.  g. Echocardiogram showing an estimated EF of 0.45 to 0.50 with moderate tricuspid regurgitation and mild concentric left ventricular hypertrophy, 10/17/2014.   h. Non-ST  elevation MI with left heart catheterization showing nonobstructive single vessel coronary artery disease, 50% ostial stenosis of the first diagonal branch, left anterior descending coronary artery, otherwise, no hemodynamically significant coronary artery disease and estimated EF of 0.35 with features of Takotsubo cardiomyopathy, 10/15/2014, by Dr. Marks.   i. Residual class I symptoms with acceptable echocardiogram, February 2016.  j. CCS class 2 chest pain/NYHA class II dyspnea December 2016  k. Left heart catheterization, 1/5/2017; no evidence of hemodynamically significant coronary artery disease, mild nonobstructive plaque in the LAD and first diagonal noted, LVEF 0.55, arterial hypertension, otherwise normal hemodynamics, medical therapy and risk factor management recommended  l. Residual class I symptoms, October 2019, May 2020, July 2021, February 2022.  12. Intermittent tinnitus, AD, recurrent, October 2013.   13. Remote abnormal uterine bleeding with apparent abnormal uterine biopsy with apparent uterine cancer, stage and type unknown-data deficit, summer 2005, with subsequent SERG/BSO with apparent negative lymph node sampling for well differentiated endometrial carcinoma, September 2005.   14. Chronic tobacco use with abnormal chest x-ray with discontinued tobacco use, autumn 2011.   15. GERD/gastritis:  a. Abnormal EGD, May 2013.   b. Initiation of Dexilant therapy, January 2013, with continuation after EGD, May 2013    No Known Allergies    Current Outpatient Medications:   •  amLODIPine (NORVASC) 5 MG tablet, Take 5 mg by mouth Daily., Disp: , Rfl:   •  atorvastatin (LIPITOR) 20 MG tablet, Take 20 mg by mouth Daily., Disp: , Rfl:   •  azelastine (ASTELIN) 0.1 % nasal spray, ISTILL TWO SPRAYS IN EACH NOSTRIL EVERY TWELVE HOURS, Disp: , Rfl:   •  Beconase AQ 42 MCG/SPRAY nasal spray, INSTILL ONE SPRAY IN EACH NOSTRIL ONCE DAILY AS NEEDED, Disp: , Rfl:   •  bisoprolol-hydrochlorothiazide (ZIAC) 2.5-6.25  MG per tablet, Take 1 tablet by mouth Daily., Disp: 90 tablet, Rfl: 3  •  bumetanide (BUMEX) 1 MG tablet, TAKE ONE TABLET TWICE DAILY, Disp: 180 tablet, Rfl: 0  •  calcitriol (ROCALTROL) 0.25 MCG capsule, Take 0.25 mcg by mouth Daily., Disp: , Rfl:   •  clopidogrel (PLAVIX) 75 MG tablet, Take 1 tablet by mouth Daily., Disp: 90 tablet, Rfl: 3  •  glipizide (GLUCOTROL XL) 5 MG ER tablet, Take 5 mg by mouth Daily., Disp: , Rfl:   •  isosorbide mononitrate (IMDUR) 120 MG 24 hr tablet, TAKE ONE TABLET ONCE DAILY, Disp: 90 tablet, Rfl: 3  •  levothyroxine (SYNTHROID, LEVOTHROID) 150 MCG tablet, Take 150 mcg by mouth Daily., Disp: , Rfl: 0  •  lubiprostone (Amitiza) 24 MCG capsule, Take 24 mcg by mouth Daily With Breakfast., Disp: , Rfl:   •  mometasone-formoterol (Dulera) 100-5 MCG/ACT inhaler, Inhale 1 puff Daily As Needed (100mcg/5mcg)., Disp: , Rfl:   •  nitroglycerin (Nitrostat) 0.4 MG SL tablet, Place 1 tablet under the tongue Every 5 (Five) Minutes As Needed for Chest Pain. Take no more than 3 doses in 15 minutes., Disp: 25 tablet, Rfl: 6  •  pantoprazole (PROTONIX) 40 MG EC tablet, Take 40 mg by mouth Daily., Disp: , Rfl:   •  Tradjenta 5 MG tablet tablet, Take 5 mg by mouth Every Morning., Disp: , Rfl:   •  telmisartan (MICARDIS) 80 MG tablet, TAKE ONE TABLET ONCE DAILY, Disp: 90 tablet, Rfl: 3    History of Present Illness: Patient returns for scheduled 7-month follow up. She had a fall about 2 months ago in which she landed on her left shoulder and it has been painful since. She has not had medical evaluation or imaging of her shoulder. She reports this pain has been worse over the past 1 week. She states her blood pressure will occasionally become too low. Patient otherwise denies chest pain, shortness of breath, palpitations, edema, dizziness, and syncope. She has had no interim ER visits, hospitalizations, serious illnesses, or surgeries. She has not received COVID immunization. She has not had interim  "laboratory studies but will follow up with Mr. Rodriguez next week.      ROS   Obtained and negative except as outlined in problem list and HPI.    Procedures       Objective:       Vitals:    02/02/22 1350 02/02/22 1353   BP: 127/50 132/49   BP Location: Left arm Left arm   Patient Position: Sitting Standing   Pulse: 58 60   SpO2: 94%    Weight: 87.2 kg (192 lb 3.2 oz)    Height: 167.6 cm (66\")      Body mass index is 31.02 kg/m².  Last weight: 193 lbs    Vitals reviewed.   Constitutional:       Appearance: Well-developed.   Neck:      Thyroid: No thyromegaly.      Vascular: No carotid bruit or JVD.      Lymphadenopathy: No cervical adenopathy.   Pulmonary:      Effort: Pulmonary effort is normal.      Breath sounds: Decreased breath sounds present. No wheezing. No rhonchi. No rales.   Cardiovascular:      Regular rhythm.      Murmurs: There is a grade 1/6 mid frequency midsystolic murmur at the URSB.      No gallop. No S3 gallop.   Pulses:     Dorsalis pedis: 1+ bilaterally.     Posterior tibial: 1+ bilaterally.  Edema:     Peripheral edema absent.   Abdominal:      Palpations: Abdomen is soft. There is no abdominal mass.      Tenderness: There is no abdominal tenderness.   Musculoskeletal: Normal range of motion. Skin:     General: Skin is warm and dry.      Findings: No rash.   Neurological:      Mental Status: Alert and oriented to person, place, and time.           Lab Review:     No recent laboratory studies available for review today.        Assessment:       Overall continued acceptable course with no new interim cardiopulmonary complaints with acceptable functional status. We will defer additional diagnostic or therapeutic intervention from a cardiac perspective at this time. Hopefully we will be able to obtain her upcoming laboratory study results for review with the patient by letter.     Diagnosis Plan   1. Ischemic heart disease  No recurrent angina pectoris or CHF on current activity schedule; continue " current treatment   2. PAF (paroxysmal atrial fibrillation) (HCC)  No documented recurrence. Continue current treatment.   3. Severe hypertension  Well controlled. Decrease amlodipine to 2.5 mg daily.   4. Dyslipidemia  No data to review. Continue atorvastatin.          Plan:         1. Patient to continue current medications and close follow up with the above providers and decrease amlodipine to 2.5 mg daily.  2. XR left shoulder ordered; she will have this completed this afternoon while she is here.  3. Tentative cardiology follow up in August 2022 or patient may return sooner PRN.    I, Enmanuel Gates, attest that this documentation has been prepared under the direction and in the presence of Alexy Blake MD 02/02/2022    I, Alexy Blake MD, [facility], personally performed the services described in this documentation as scribed by the above named individual in my presence, and it is both accurate and complete. At 14:17 EST on 02/02/2022

## 2022-02-14 RX ORDER — BISOPROLOL FUMARATE AND HYDROCHLOROTHIAZIDE 2.5; 6.25 MG/1; MG/1
TABLET ORAL
Qty: 90 TABLET | Refills: 3 | Status: SHIPPED | OUTPATIENT
Start: 2022-02-14 | End: 2022-04-20 | Stop reason: SDUPTHER

## 2022-04-20 ENCOUNTER — TELEPHONE (OUTPATIENT)
Dept: CARDIOLOGY | Facility: CLINIC | Age: 82
End: 2022-04-20

## 2022-04-20 RX ORDER — BISOPROLOL FUMARATE AND HYDROCHLOROTHIAZIDE 2.5; 6.25 MG/1; MG/1
1 TABLET ORAL DAILY
Qty: 90 TABLET | Refills: 3 | Status: SHIPPED | OUTPATIENT
Start: 2022-04-20 | End: 2022-07-29 | Stop reason: SDUPTHER

## 2022-04-20 NOTE — TELEPHONE ENCOUNTER
Noted; would restart bisoprolol HCTZ as prescribed and have her update us with symptoms in the next 3-4 days.    Thanks!

## 2022-04-20 NOTE — TELEPHONE ENCOUNTER
Pt called with complaints of labile BP and complaints of pain in both arms with high BP readings. Pt reports dizziness, palpitations, and pain on left side of chest. Pt had SOB today with walking. Pt reports symptoms for past two weeks.      145/87  110/68  154/62  134/56  113/63   HR 77-94        Patient has taken nitro with chest and arm pain which has helped. Pt's arm pain is never completely alleviated.     Current cardiac meds:  Amlodipine 2.5 mg daily AM  atorvastatin 20 mg daily AM  Plavix 75 mg daily  Imdur 120 mg daily  Bumex 1 mg daily    Pt has bisoprolol-HCTZ 2.5-6.25 mg daily and telmisartan 80 mg daily on med list, but has no recollection of taking these medications and isn't currently taking.    Please advise.

## 2022-04-29 NOTE — TELEPHONE ENCOUNTER
Patient called to update symptoms and BP/HR readings.     110s-130s/50s-60s HR 50s-60s    Pt states that she feels better, more energy. Pt states that she hasn't had pain in her arms since Monday, hasn't had pain in her chest for a couple days. SOB has not changed.     Current cardiac meds:  Amlodipine 2.5 mg daily AM  atorvastatin 20 mg daily AM  Plavix 75 mg daily  Imdur 120 mg daily  Bumex 1 mg daily  bisoprolol-HCTZ 2.5-6.25 mg daily    Please advise.

## 2022-04-29 NOTE — TELEPHONE ENCOUNTER
Noted; would continue current treatment and have her remain as active as possible and update us if additional symptoms or elevated blood pressure readings or elevated heart rate.  Current readings are quite nominal and acceptable.    Thanks!

## 2022-05-26 ENCOUNTER — TELEPHONE (OUTPATIENT)
Dept: CARDIOLOGY | Facility: CLINIC | Age: 82
End: 2022-05-26

## 2022-05-26 DIAGNOSIS — I20.8 ANGINAL EQUIVALENT: ICD-10-CM

## 2022-05-26 DIAGNOSIS — I25.110 CORONARY ARTERY DISEASE INVOLVING NATIVE CORONARY ARTERY OF NATIVE HEART WITH UNSTABLE ANGINA PECTORIS: Primary | ICD-10-CM

## 2022-05-26 RX ORDER — BUMETANIDE 1 MG/1
1 TABLET ORAL 2 TIMES DAILY
Qty: 180 TABLET | Refills: 0 | Status: SHIPPED | OUTPATIENT
Start: 2022-05-26 | End: 2022-07-29 | Stop reason: SDUPTHER

## 2022-05-26 RX ORDER — AMLODIPINE BESYLATE 5 MG/1
5 TABLET ORAL DAILY
Qty: 90 TABLET | Refills: 3 | Status: ON HOLD | OUTPATIENT
Start: 2022-05-26 | End: 2022-06-15

## 2022-05-26 NOTE — TELEPHONE ENCOUNTER
Noted; would have her increase her amlodipine to 5 mg daily and have her come in for left heart cath plus minus intervention ASAP.    Thanks!

## 2022-05-26 NOTE — TELEPHONE ENCOUNTER
Patient called and stated that she is having chest pain every day and has been taking 3 nitro a day for a past week, gradually increasing from 1-2 daily for the past month. Patient reports intermittent SOB, dizziness with low BP, occasional palpitations. Denies swelling.    BP running 94-130s/50s HR 60s Patient states that one time her systolic got up to 200.     Amlodipine 2.5 mg daily  Bisoprolol-HCTZ 2.5-6.25 mg daily  bumex 1 mg BID  Plavix 75 mg daily  Imdur 120 mg daily  Atorvastatin 20 mg daily    Please advise.

## 2022-06-06 ENCOUNTER — PREP FOR SURGERY (OUTPATIENT)
Dept: OTHER | Facility: HOSPITAL | Age: 82
End: 2022-06-06

## 2022-06-06 DIAGNOSIS — I20.0 UNSTABLE ANGINA: Primary | ICD-10-CM

## 2022-06-06 PROBLEM — I20.8 ANGINAL EQUIVALENT: Status: ACTIVE | Noted: 2022-06-06

## 2022-06-06 PROBLEM — I20.89 ANGINAL EQUIVALENT: Status: ACTIVE | Noted: 2022-06-06

## 2022-06-06 RX ORDER — ASPIRIN 81 MG/1
81 TABLET ORAL DAILY
Status: CANCELLED | OUTPATIENT
Start: 2022-06-07

## 2022-06-06 RX ORDER — NITROGLYCERIN 0.4 MG/1
0.4 TABLET SUBLINGUAL
Status: CANCELLED | OUTPATIENT
Start: 2022-06-06

## 2022-06-06 RX ORDER — ACETAMINOPHEN 325 MG/1
650 TABLET ORAL EVERY 4 HOURS PRN
Status: CANCELLED | OUTPATIENT
Start: 2022-06-06

## 2022-06-06 RX ORDER — SODIUM CHLORIDE 0.9 % (FLUSH) 0.9 %
10 SYRINGE (ML) INJECTION EVERY 12 HOURS SCHEDULED
Status: CANCELLED | OUTPATIENT
Start: 2022-06-06

## 2022-06-06 RX ORDER — SODIUM CHLORIDE 0.9 % (FLUSH) 0.9 %
10 SYRINGE (ML) INJECTION AS NEEDED
Status: CANCELLED | OUTPATIENT
Start: 2022-06-06

## 2022-06-06 RX ORDER — ASPIRIN 81 MG/1
324 TABLET, CHEWABLE ORAL ONCE
Status: CANCELLED | OUTPATIENT
Start: 2022-06-06 | End: 2022-06-06

## 2022-06-14 PROBLEM — I20.8 ANGINAL EQUIVALENT: Status: RESOLVED | Noted: 2022-06-06 | Resolved: 2022-06-14

## 2022-06-14 PROBLEM — I20.89 ANGINAL EQUIVALENT: Status: RESOLVED | Noted: 2022-06-06 | Resolved: 2022-06-14

## 2022-06-14 PROBLEM — R07.9 CHEST PAIN: Status: RESOLVED | Noted: 2017-01-05 | Resolved: 2022-06-14

## 2022-06-14 PROBLEM — I10 ESSENTIAL HYPERTENSION: Status: ACTIVE | Noted: 2022-06-14

## 2022-06-14 PROBLEM — I25.9 ISCHEMIC HEART DISEASE: Status: RESOLVED | Noted: 2019-10-25 | Resolved: 2022-06-14

## 2022-06-15 ENCOUNTER — HOSPITAL ENCOUNTER (OUTPATIENT)
Facility: HOSPITAL | Age: 82
Setting detail: HOSPITAL OUTPATIENT SURGERY
Discharge: HOME | End: 2022-06-15
Attending: INTERNAL MEDICINE | Admitting: INTERNAL MEDICINE

## 2022-06-15 ENCOUNTER — APPOINTMENT (OUTPATIENT)
Dept: CARDIOLOGY | Facility: HOSPITAL | Age: 82
End: 2022-06-15

## 2022-06-15 VITALS
BODY MASS INDEX: 29.07 KG/M2 | HEART RATE: 68 BPM | WEIGHT: 180.91 LBS | HEIGHT: 66 IN | OXYGEN SATURATION: 91 % | DIASTOLIC BLOOD PRESSURE: 63 MMHG | TEMPERATURE: 96.8 F | RESPIRATION RATE: 16 BRPM | SYSTOLIC BLOOD PRESSURE: 152 MMHG

## 2022-06-15 DIAGNOSIS — I25.110 CORONARY ARTERY DISEASE INVOLVING NATIVE CORONARY ARTERY OF NATIVE HEART WITH UNSTABLE ANGINA PECTORIS: ICD-10-CM

## 2022-06-15 DIAGNOSIS — E11.9 TYPE 2 DIABETES MELLITUS WITHOUT COMPLICATION, WITHOUT LONG-TERM CURRENT USE OF INSULIN: Primary | ICD-10-CM

## 2022-06-15 DIAGNOSIS — I20.8 ANGINAL EQUIVALENT: ICD-10-CM

## 2022-06-15 DIAGNOSIS — I20.0 UNSTABLE ANGINA: ICD-10-CM

## 2022-06-15 PROBLEM — I20.89 ANGINAL EQUIVALENT: Status: ACTIVE | Noted: 2022-06-06

## 2022-06-15 PROBLEM — I20.89 ANGINAL EQUIVALENT: Status: RESOLVED | Noted: 2022-06-06 | Resolved: 2022-06-15

## 2022-06-15 LAB
ALBUMIN SERPL-MCNC: 4.4 G/DL (ref 3.5–5.2)
ALBUMIN/GLOB SERPL: 1.3 G/DL
ALP SERPL-CCNC: 59 U/L (ref 39–117)
ALT SERPL W P-5'-P-CCNC: 12 U/L (ref 1–33)
ANION GAP SERPL CALCULATED.3IONS-SCNC: 9 MMOL/L (ref 5–15)
AST SERPL-CCNC: 16 U/L (ref 1–32)
BASOPHILS # BLD AUTO: 0.05 10*3/MM3 (ref 0–0.2)
BASOPHILS NFR BLD AUTO: 0.6 % (ref 0–1.5)
BILIRUB SERPL-MCNC: 0.6 MG/DL (ref 0–1.2)
BUN SERPL-MCNC: 45 MG/DL (ref 8–23)
BUN/CREAT SERPL: 32.6 (ref 7–25)
CALCIUM SPEC-SCNC: 10.2 MG/DL (ref 8.6–10.5)
CHLORIDE SERPL-SCNC: 95 MMOL/L (ref 98–107)
CHOLEST SERPL-MCNC: 150 MG/DL (ref 0–200)
CO2 SERPL-SCNC: 30 MMOL/L (ref 22–29)
CREAT SERPL-MCNC: 1.38 MG/DL (ref 0.57–1)
DEPRECATED RDW RBC AUTO: 41.4 FL (ref 37–54)
EGFRCR SERPLBLD CKD-EPI 2021: 38.8 ML/MIN/1.73
EOSINOPHIL # BLD AUTO: 0.13 10*3/MM3 (ref 0–0.4)
EOSINOPHIL NFR BLD AUTO: 1.7 % (ref 0.3–6.2)
ERYTHROCYTE [DISTWIDTH] IN BLOOD BY AUTOMATED COUNT: 12.6 % (ref 12.3–15.4)
GLOBULIN UR ELPH-MCNC: 3.3 GM/DL
GLUCOSE SERPL-MCNC: 163 MG/DL (ref 65–99)
HBA1C MFR BLD: 7 % (ref 4.8–5.6)
HCT VFR BLD AUTO: 38.7 % (ref 34–46.6)
HDLC SERPL-MCNC: 62 MG/DL (ref 40–60)
HGB BLD-MCNC: 12.6 G/DL (ref 12–15.9)
IMM GRANULOCYTES # BLD AUTO: 0.01 10*3/MM3 (ref 0–0.05)
IMM GRANULOCYTES NFR BLD AUTO: 0.1 % (ref 0–0.5)
LDLC SERPL CALC-MCNC: 64 MG/DL (ref 0–100)
LDLC/HDLC SERPL: 0.96 {RATIO}
LYMPHOCYTES # BLD AUTO: 2.84 10*3/MM3 (ref 0.7–3.1)
LYMPHOCYTES NFR BLD AUTO: 36.6 % (ref 19.6–45.3)
MCH RBC QN AUTO: 29.3 PG (ref 26.6–33)
MCHC RBC AUTO-ENTMCNC: 32.6 G/DL (ref 31.5–35.7)
MCV RBC AUTO: 90 FL (ref 79–97)
MONOCYTES # BLD AUTO: 0.87 10*3/MM3 (ref 0.1–0.9)
MONOCYTES NFR BLD AUTO: 11.2 % (ref 5–12)
NEUTROPHILS NFR BLD AUTO: 3.86 10*3/MM3 (ref 1.7–7)
NEUTROPHILS NFR BLD AUTO: 49.8 % (ref 42.7–76)
NRBC BLD AUTO-RTO: 0 /100 WBC (ref 0–0.2)
PLATELET # BLD AUTO: 153 10*3/MM3 (ref 140–450)
PMV BLD AUTO: 9.7 FL (ref 6–12)
POTASSIUM SERPL-SCNC: 3.9 MMOL/L (ref 3.5–5.2)
PROT SERPL-MCNC: 7.7 G/DL (ref 6–8.5)
RBC # BLD AUTO: 4.3 10*6/MM3 (ref 3.77–5.28)
SODIUM SERPL-SCNC: 134 MMOL/L (ref 136–145)
TRIGL SERPL-MCNC: 142 MG/DL (ref 0–150)
VLDLC SERPL-MCNC: 24 MG/DL (ref 5–40)
WBC NRBC COR # BLD: 7.76 10*3/MM3 (ref 3.4–10.8)

## 2022-06-15 PROCEDURE — 0 IOPAMIDOL PER 1 ML: Performed by: INTERNAL MEDICINE

## 2022-06-15 PROCEDURE — 25010000002 MIDAZOLAM PER 1 MG: Performed by: INTERNAL MEDICINE

## 2022-06-15 PROCEDURE — C1894 INTRO/SHEATH, NON-LASER: HCPCS | Performed by: INTERNAL MEDICINE

## 2022-06-15 PROCEDURE — 85025 COMPLETE CBC W/AUTO DIFF WBC: CPT | Performed by: NURSE PRACTITIONER

## 2022-06-15 PROCEDURE — 83036 HEMOGLOBIN GLYCOSYLATED A1C: CPT | Performed by: NURSE PRACTITIONER

## 2022-06-15 PROCEDURE — S0260 H&P FOR SURGERY: HCPCS | Performed by: INTERNAL MEDICINE

## 2022-06-15 PROCEDURE — 76937 US GUIDE VASCULAR ACCESS: CPT | Performed by: INTERNAL MEDICINE

## 2022-06-15 PROCEDURE — 93306 TTE W/DOPPLER COMPLETE: CPT | Performed by: INTERNAL MEDICINE

## 2022-06-15 PROCEDURE — C1769 GUIDE WIRE: HCPCS | Performed by: INTERNAL MEDICINE

## 2022-06-15 PROCEDURE — 93306 TTE W/DOPPLER COMPLETE: CPT

## 2022-06-15 PROCEDURE — 25010000002 FENTANYL CITRATE (PF) 50 MCG/ML SOLUTION: Performed by: INTERNAL MEDICINE

## 2022-06-15 PROCEDURE — 93458 L HRT ARTERY/VENTRICLE ANGIO: CPT | Performed by: INTERNAL MEDICINE

## 2022-06-15 PROCEDURE — 80053 COMPREHEN METABOLIC PANEL: CPT | Performed by: NURSE PRACTITIONER

## 2022-06-15 PROCEDURE — C1760 CLOSURE DEV, VASC: HCPCS

## 2022-06-15 PROCEDURE — 80061 LIPID PANEL: CPT | Performed by: NURSE PRACTITIONER

## 2022-06-15 RX ORDER — SODIUM CHLORIDE 0.9 % (FLUSH) 0.9 %
10 SYRINGE (ML) INJECTION AS NEEDED
Status: DISCONTINUED | OUTPATIENT
Start: 2022-06-15 | End: 2022-06-15 | Stop reason: HOSPADM

## 2022-06-15 RX ORDER — LIDOCAINE HYDROCHLORIDE 10 MG/ML
INJECTION, SOLUTION EPIDURAL; INFILTRATION; INTRACAUDAL; PERINEURAL AS NEEDED
Status: DISCONTINUED | OUTPATIENT
Start: 2022-06-15 | End: 2022-06-15 | Stop reason: HOSPADM

## 2022-06-15 RX ORDER — NITROGLYCERIN 0.4 MG/1
0.4 TABLET SUBLINGUAL
Status: DISCONTINUED | OUTPATIENT
Start: 2022-06-15 | End: 2022-06-15 | Stop reason: HOSPADM

## 2022-06-15 RX ORDER — MIDAZOLAM HYDROCHLORIDE 1 MG/ML
INJECTION INTRAMUSCULAR; INTRAVENOUS AS NEEDED
Status: DISCONTINUED | OUTPATIENT
Start: 2022-06-15 | End: 2022-06-15 | Stop reason: HOSPADM

## 2022-06-15 RX ORDER — FENTANYL CITRATE 50 UG/ML
INJECTION, SOLUTION INTRAMUSCULAR; INTRAVENOUS AS NEEDED
Status: DISCONTINUED | OUTPATIENT
Start: 2022-06-15 | End: 2022-06-15 | Stop reason: HOSPADM

## 2022-06-15 RX ORDER — SODIUM CHLORIDE 9 MG/ML
3 INJECTION, SOLUTION INTRAVENOUS CONTINUOUS
Status: ACTIVE | OUTPATIENT
Start: 2022-06-15 | End: 2022-06-15

## 2022-06-15 RX ORDER — ASPIRIN 81 MG/1
324 TABLET, CHEWABLE ORAL ONCE
Status: COMPLETED | OUTPATIENT
Start: 2022-06-15 | End: 2022-06-15

## 2022-06-15 RX ORDER — AMLODIPINE BESYLATE 10 MG/1
10 TABLET ORAL DAILY
COMMUNITY
End: 2022-06-17

## 2022-06-15 RX ORDER — ACETAMINOPHEN 325 MG/1
650 TABLET ORAL EVERY 4 HOURS PRN
Status: DISCONTINUED | OUTPATIENT
Start: 2022-06-15 | End: 2022-06-15 | Stop reason: HOSPADM

## 2022-06-15 RX ORDER — ASPIRIN 81 MG/1
81 TABLET ORAL DAILY
Status: DISCONTINUED | OUTPATIENT
Start: 2022-06-16 | End: 2022-06-15 | Stop reason: HOSPADM

## 2022-06-15 RX ORDER — SODIUM CHLORIDE 0.9 % (FLUSH) 0.9 %
10 SYRINGE (ML) INJECTION EVERY 12 HOURS SCHEDULED
Status: DISCONTINUED | OUTPATIENT
Start: 2022-06-15 | End: 2022-06-15 | Stop reason: HOSPADM

## 2022-06-15 RX ADMIN — SODIUM CHLORIDE 3 ML/KG/HR: 9 INJECTION, SOLUTION INTRAVENOUS at 10:42

## 2022-06-15 RX ADMIN — ASPIRIN 81 MG CHEWABLE TABLET 324 MG: 81 TABLET CHEWABLE at 10:42

## 2022-06-15 NOTE — H&P
"Pre-Cardiac Catheterization Report  Cardiovascular Laboratory  [facility]      Patient:  Lino Guerrero  :  1940  PCP:  Aj Rodriguez APRN  PHONE:  [phone]    DATE: 6/15/2022    BRIEF HPI:  Lino Guerrero is a 82 y.o. female with a history of nonobstructive coronary artery disease, Takotsubo cardiomyopathy, type 2 diabetes mellitus, hypertension and hyperlipidemia who is being referred by Dr. Alexy Blake to undergo cardiac catheterization for increased shortness of breath and chest pain requiring increased use of nitroglycerin.  According to the patient she is unable to \"hardly do anything\" and will get short of breath just walking across the room.  Her last heart cath was in  which showed moderate one-vessel disease of a diagonal branch.  At that time she had a reduced LVEF and wall motion was consistent with Takotsubo cardiomyopathy.    Cardiac Risk Factors: Known CAD, hypertension, hyperlipidemia, advanced age, type 2 diabetes mellitus    Anginal class in last 2 weeks:  CCS class III    CHF Class in last 2 weeks:  NYHA Class II    Cardiogenic shock:  no    Cardiac arrest <24 hours:  no    Stress test within last 6 months:   no   Details:    Previous cardiac catheterization:  yes  Details: Cardiac cath 2017.  No significant CAD.  Mild nonobstructive CAD in LAD.  Medical management.    Previous CABG:  no  Details:      Allergies:     IV contrast allergy:  no  No Known Allergies    MEDICATIONS:  Prior to Admission medications    Medication Sig Start Date End Date Taking? Authorizing Provider   amLODIPine (NORVASC) 5 MG tablet Take 1 tablet by mouth Daily. 22   Alexy Blake MD   atorvastatin (LIPITOR) 20 MG tablet Take 20 mg by mouth Daily.    Provider, MD Gisela   azelastine (ASTELIN) 0.1 % nasal spray ISTILL TWO SPRAYS IN EACH NOSTRIL EVERY TWELVE HOURS 21   ProviderGisela MD   Beconase AQ 42 MCG/SPRAY nasal spray INSTILL ONE SPRAY IN EACH " NOSTRIL ONCE DAILY AS NEEDED 11/30/21   Gisela Walker MD   bisoprolol-hydrochlorothiazide (ZIAC) 2.5-6.25 MG per tablet Take 1 tablet by mouth Daily. 4/20/22   Alexy Blake MD   bumetanide (BUMEX) 1 MG tablet Take 1 tablet by mouth 2 (Two) Times a Day. 5/26/22   Alexy Blake MD   calcitriol (ROCALTROL) 0.25 MCG capsule Take 0.25 mcg by mouth Daily.    Gisela Walker MD   clopidogrel (PLAVIX) 75 MG tablet Take 1 tablet by mouth Daily. 10/1/20   Alexy Blake MD   glipizide (GLUCOTROL XL) 5 MG ER tablet Take 5 mg by mouth Daily. 11/30/21   Gisela Walker MD   isosorbide mononitrate (IMDUR) 120 MG 24 hr tablet TAKE ONE TABLET ONCE DAILY 9/23/21   Alexy Blake MD   levothyroxine (SYNTHROID, LEVOTHROID) 150 MCG tablet Take 150 mcg by mouth Daily. 10/14/19   Gisela Walker MD   lubiprostone (Amitiza) 24 MCG capsule Take 24 mcg by mouth Daily With Breakfast.    Gisela Walker MD   mometasone-formoterol (Dulera) 100-5 MCG/ACT inhaler Inhale 1 puff Daily As Needed (100mcg/5mcg).    Gisela Walker MD   nitroglycerin (Nitrostat) 0.4 MG SL tablet Place 1 tablet under the tongue Every 5 (Five) Minutes As Needed for Chest Pain. Take no more than 3 doses in 15 minutes. 10/1/20   Alexy Blake MD   pantoprazole (PROTONIX) 40 MG EC tablet Take 40 mg by mouth Daily.    Gisela Walker MD   Tradjenta 5 MG tablet tablet Take 5 mg by mouth Every Morning. 11/30/21   Gisela Walker MD       Past medical & surgical history, social and family history reviewed in the electronic medical record.    ROS:  Systems reviewed were negative except pertinent positives listed in HPI    Physical Exam:    Vitals:   Vitals:    06/15/22 1020   BP: 141/54   Pulse: 59   Resp:    Temp:    SpO2:           06/15/22  1018   Weight: 82.6 kg (182 lb 3.2 oz)     Constitutional:       Appearance: Healthy appearance. Well-developed.   Eyes:      General: Lids are normal. No scleral icterus.      Conjunctiva/sclera: Conjunctivae normal.   HENT:      Head: Normocephalic and atraumatic.   Neck:      Thyroid: No thyromegaly.      Vascular: No carotid bruit or JVD.   Pulmonary:      Effort: Pulmonary effort is normal.      Breath sounds: Normal breath sounds. No wheezing. No rhonchi. No rales.   Cardiovascular:      Normal rate. Regular rhythm.      Murmurs: There is no murmur.      No gallop. No rub.   Pulses:     Intact distal pulses.   Edema:     Peripheral edema absent.   Abdominal:      General: There is no distension.      Palpations: Abdomen is soft. There is no abdominal mass.   Musculoskeletal:      Cervical back: Normal range of motion. Skin:     General: Skin is warm and dry.      Findings: No rash.   Neurological:      General: No focal deficit present.      Mental Status: Alert and oriented to person, place, and time.      Gait: Gait is intact.   Psychiatric:         Attention and Perception: Attention normal.         Mood and Affect: Mood normal.         Behavior: Behavior normal.       Barbaeu Test:  Left: Normal  (oxymetric Allens) Right: Normal         Results from last 7 days   Lab Units 06/15/22  1035   WBC 10*3/mm3 7.76   HEMOGLOBIN g/dL 12.6   HEMATOCRIT % 38.7   PLATELETS 10*3/mm3 153     Lab Results   Component Value Date    CHLPL 125 10/16/2014    TRIG 83 01/05/2017    HDL 69 (H) 01/05/2017    AST 22 01/05/2017    ALT 14 01/05/2017           Impression      Active Hospital Problems    Diagnosis    • **Coronary artery disease involving native coronary artery of native heart with unstable angina pectoris (HCC)      · Cardiac cath at [facility] (6/2011): Moderate branch vessel CAD.  Medical therapy recommended  · Nuclear stress (6/4/2013): Small apical ischemia  · Cardiac cath for NSTEMI (10/15/2014): Moderate one-vessel CAD of diagonal branch of LAD.  LVEF 35%.  Wall motion consistent with Takotsubo cardiomyopathy       • Type 2 diabetes mellitus without complication, without  long-term current use of insulin (HCC)    • Essential hypertension      ·  Acceptable renal nuclear blood flow scan/renal ultrasound/abnormal acceptable abdominal CT scan 4/1997  · Recurrent progressive hypertensive blood pressure with acceptable selective bilateral renal arteriography  · Target BP <130/80 mmHg       • Hyperlipidemia LDL goal <70        Plan   Patient presents today to undergo cardiac catheterization for NYHA/CCS class II-III symptoms.  The risks and benefits of the procedure were discussed and the patient is agreeable to proceed.  Of note the patient does not take daily aspirin but takes 75 mg Plavix daily.  She has been premedicated with 324 mg aspirin today.    · Lab results pending if acceptable proceed with cardiac cath via the right radial approach  · Further recommendations to follow        SAMUEL Salguero   06/15/22  10:46 EDT

## 2022-06-16 LAB
ASCENDING AORTA: 2.7 CM
BH CV ECHO MEAS - AO MAX PG: 7.8 MMHG
BH CV ECHO MEAS - AO MEAN PG: 4 MMHG
BH CV ECHO MEAS - AO ROOT DIAM: 3.1 CM
BH CV ECHO MEAS - AO V2 MAX: 140 CM/SEC
BH CV ECHO MEAS - AO V2 VTI: 36.2 CM
BH CV ECHO MEAS - AVA(I,D): 2.6 CM2
BH CV ECHO MEAS - EDV(CUBED): 132.7 ML
BH CV ECHO MEAS - EDV(MOD-SP2): 124 ML
BH CV ECHO MEAS - EDV(MOD-SP4): 124 ML
BH CV ECHO MEAS - EF(MOD-BP): 59.2 %
BH CV ECHO MEAS - EF(MOD-SP2): 61.7 %
BH CV ECHO MEAS - EF(MOD-SP4): 56.7 %
BH CV ECHO MEAS - ESV(CUBED): 29.8 ML
BH CV ECHO MEAS - ESV(MOD-SP2): 47.5 ML
BH CV ECHO MEAS - ESV(MOD-SP4): 53.7 ML
BH CV ECHO MEAS - FS: 39.2 %
BH CV ECHO MEAS - IVS/LVPW: 0.89 CM
BH CV ECHO MEAS - IVSD: 0.8 CM
BH CV ECHO MEAS - LA DIMENSION: 3.3 CM
BH CV ECHO MEAS - LAT PEAK E' VEL: 8.1 CM/SEC
BH CV ECHO MEAS - LV MASS(C)D: 151.8 GRAMS
BH CV ECHO MEAS - LV MAX PG: 5.1 MMHG
BH CV ECHO MEAS - LV MEAN PG: 2 MMHG
BH CV ECHO MEAS - LV V1 MAX: 113 CM/SEC
BH CV ECHO MEAS - LV V1 VTI: 30.5 CM
BH CV ECHO MEAS - LVIDD: 5.1 CM
BH CV ECHO MEAS - LVIDS: 3.1 CM
BH CV ECHO MEAS - LVOT AREA: 3.1 CM2
BH CV ECHO MEAS - LVOT DIAM: 2 CM
BH CV ECHO MEAS - LVPWD: 0.9 CM
BH CV ECHO MEAS - MED PEAK E' VEL: 7.1 CM/SEC
BH CV ECHO MEAS - MV A MAX VEL: 108 CM/SEC
BH CV ECHO MEAS - MV DEC SLOPE: 303 CM/SEC2
BH CV ECHO MEAS - MV DEC TIME: 0.3 MSEC
BH CV ECHO MEAS - MV E MAX VEL: 89.4 CM/SEC
BH CV ECHO MEAS - MV E/A: 0.83
BH CV ECHO MEAS - PA ACC TIME: 0.07 SEC
BH CV ECHO MEAS - PA PR(ACCEL): 48.8 MMHG
BH CV ECHO MEAS - RAP SYSTOLE: 3 MMHG
BH CV ECHO MEAS - RVSP: 15 MMHG
BH CV ECHO MEAS - SV(LVOT): 95.8 ML
BH CV ECHO MEAS - SV(MOD-SP2): 76.5 ML
BH CV ECHO MEAS - SV(MOD-SP4): 70.3 ML
BH CV ECHO MEAS - TAPSE (>1.6): 1.76 CM
BH CV ECHO MEAS - TR MAX PG: 11.7 MMHG
BH CV ECHO MEAS - TR MAX VEL: 169.5 CM/SEC
BH CV ECHO MEASUREMENTS AVERAGE E/E' RATIO: 11.76
BH CV VAS BP RIGHT ARM: NORMAL MMHG
BH CV XLRA - RV BASE: 3.9 CM
BH CV XLRA - RV LENGTH: 7.3 CM
BH CV XLRA - RV MID: 3.2 CM
BH CV XLRA - TDI S': 11.6 CM/SEC
LEFT ATRIUM VOLUME INDEX: 33.8 ML/M2
LV EF 2D ECHO EST: 60 %
MAXIMAL PREDICTED HEART RATE: 140 BPM
STRESS TARGET HR: 119 BPM

## 2022-06-17 ENCOUNTER — TELEPHONE (OUTPATIENT)
Dept: CARDIOLOGY | Facility: CLINIC | Age: 82
End: 2022-06-17

## 2022-06-17 RX ORDER — METOLAZONE 2.5 MG/1
2.5 TABLET ORAL DAILY PRN
Qty: 90 TABLET | Refills: 0 | Status: SHIPPED | OUTPATIENT
Start: 2022-06-17 | End: 2022-07-29 | Stop reason: SDUPTHER

## 2022-06-17 NOTE — TELEPHONE ENCOUNTER
Patient called and states that she is having SOB, increased when she lays flat, productive cough with clear/jimmy sputum, and swelling in feet and abdomen. Patient reports that swelling started at the beginning of the week.     ECHO 5/16/22, labs and  LHC 6/15/22    Amlodipine 2.5 mg daily  Bisoprolol-HCTZ 2.5-6.25 mg daily  bumex 1 mg BID  Plavix 75 mg daily  Imdur 120 mg daily  Atorvastatin 20 mg daily    Pt denies chest pain, dizziness, palpitations.     Pt hasn't started Jardiance yet, pharmacy had to order medication.     Please advise.

## 2022-06-17 NOTE — TELEPHONE ENCOUNTER
Noted; would discontinue amlodipine and initiate metolazone 2.5 mg daily as needed severe edema/shortness of breath and update us with her symptoms, blood pressure reading, and weight in 3-4 days.  Her recent cardiac studies were overall acceptable and encouraging.    Thanks!

## 2022-06-22 NOTE — TELEPHONE ENCOUNTER
Noted and concur.  Would not take either Bumex or metolazone until systolic blood pressure consistently at or above 110 torr and then to start Bumex on a regular schedule and use metolazone sparingly.    Thanks!

## 2022-06-22 NOTE — TELEPHONE ENCOUNTER
Called pt, line ringing busy. Called pt's son and asked him to have pt call me back. Pt's son agreeable to plan.

## 2022-06-22 NOTE — TELEPHONE ENCOUNTER
Patient called and stated that her BP is running low. Patient states swelling and SOB resolved a couple days ago, but she continued the metolazone 2.5 mg daily until yesterday, she did not take metolazone today.     6/21 /94   6/22 am 99/58  104/48 56    Current cardiac meds:  Metolazone 2.5 mg daily PRN-pt has taken everyday since 6/17/22  Bisoprolol-HCTZ 2.5-6.25 mg daily  bumex 1 mg BID  Plavix 75 mg daily  Imdur 120 mg daily  Atorvastatin 20 mg daily    Pt denies swelling, SOB. States that she feels good, just has low BP readings.     Advised pt to only take metolazone IF she has SOB and/or swelling return and to call Friday with updated BP readings. Pt verbalizes understanding and agreeable to plan.

## 2022-07-20 ENCOUNTER — TELEPHONE (OUTPATIENT)
Dept: CARDIOLOGY | Facility: CLINIC | Age: 82
End: 2022-07-20

## 2022-07-20 NOTE — TELEPHONE ENCOUNTER
Thank you for the update on her medications; would have her hold her Bumex unless she develops increasing edema and have her assess her blood pressure and heart rate 1-2 times daily and update us no later than the morning of 22 July 2022.    Thanks!

## 2022-07-20 NOTE — TELEPHONE ENCOUNTER
Current cardiac meds:  Bisoprolol-HCTZ 2.5-6.25 mg daily  Bumex 1 mg BID (pt never stopped)  Plavix 75 mg daily  Imdur 120 mg daily  Atorvastatin 20 mg daily  Jardiance 10 mg daily

## 2022-07-20 NOTE — TELEPHONE ENCOUNTER
"Patient call and stated that BP had been running low for the past week, starting Monday 7/18/22. Patient has headache    98/57   127/57 59  95/47 55  95/54 63  106/50 58  113/57 63  125/48 54  111/88 59    7/20 2p 103/51  pt reports \"splitting\" headache at that time     Current /58 65    Pt reports SOB, chest pain, headache, pain in neck and arms with BP fluctuation. Pt denies dizziness, palpitations.    Advised pt that if HR gets high again and maintains, to go to [facility] ED. Pt verbalizes understanding and agreeable to plan.        Please advise.   "

## 2022-07-22 NOTE — TELEPHONE ENCOUNTER
Noted; would continue current treatment and use Bumex as needed edema/fluid retention/increase shortness of breath and update us with her blood pressure readings and symptoms in the next 2-3 weeks.    Thanks!

## 2022-07-22 NOTE — TELEPHONE ENCOUNTER
Patient called to update BP readings after holding Bumex.     7/21 113/54 65   128/49 53  7/22 126/55 61   119/51 59    Patient states that she feels ok today and felt pretty good yesterday. She doesn't have a headache, or pain in neck, back, arms,shoulders. She says her head doesn't feel right, but cannot give further details,.     Pt denies SOB, chest pain, dizziness, palpitations, swelling.     Current cardiac meds:  Bisoprolol-HCTZ 2.5-6.25 mg daily  Plavix 75 mg daily  Imdur 120 mg daily  Atorvastatin 20 mg daily  Jardiance 10 mg daily    Please advise.

## 2022-07-29 ENCOUNTER — TELEPHONE (OUTPATIENT)
Dept: CARDIOLOGY | Facility: CLINIC | Age: 82
End: 2022-07-29

## 2022-07-29 DIAGNOSIS — E11.9 TYPE 2 DIABETES MELLITUS WITHOUT COMPLICATION, WITHOUT LONG-TERM CURRENT USE OF INSULIN: Primary | ICD-10-CM

## 2022-07-29 RX ORDER — ISOSORBIDE MONONITRATE 120 MG/1
120 TABLET, EXTENDED RELEASE ORAL DAILY
Qty: 90 TABLET | Refills: 3 | Status: SHIPPED | OUTPATIENT
Start: 2022-07-29

## 2022-07-29 RX ORDER — ATORVASTATIN CALCIUM 20 MG/1
20 TABLET, FILM COATED ORAL DAILY
Qty: 90 TABLET | Refills: 3 | Status: SHIPPED | OUTPATIENT
Start: 2022-07-29

## 2022-07-29 RX ORDER — LUBIPROSTONE 24 UG/1
24 CAPSULE ORAL
Qty: 90 CAPSULE | Refills: 3 | Status: SHIPPED | OUTPATIENT
Start: 2022-07-29

## 2022-07-29 RX ORDER — CLOPIDOGREL BISULFATE 75 MG/1
75 TABLET ORAL DAILY
Qty: 90 TABLET | Refills: 3 | Status: SHIPPED | OUTPATIENT
Start: 2022-07-29

## 2022-07-29 RX ORDER — METOLAZONE 2.5 MG/1
2.5 TABLET ORAL DAILY PRN
Qty: 90 TABLET | Refills: 3 | Status: SHIPPED | OUTPATIENT
Start: 2022-07-29

## 2022-07-29 RX ORDER — LEVOTHYROXINE SODIUM 0.15 MG/1
150 TABLET ORAL DAILY
Qty: 90 TABLET | Refills: 3 | Status: SHIPPED | OUTPATIENT
Start: 2022-07-29

## 2022-07-29 RX ORDER — GLIPIZIDE 5 MG/1
5 TABLET, FILM COATED, EXTENDED RELEASE ORAL DAILY
Qty: 90 TABLET | Refills: 3 | Status: SHIPPED | OUTPATIENT
Start: 2022-07-29

## 2022-07-29 RX ORDER — LINAGLIPTIN 5 MG/1
5 TABLET, FILM COATED ORAL EVERY MORNING
Qty: 90 TABLET | Refills: 3 | Status: SHIPPED | OUTPATIENT
Start: 2022-07-29

## 2022-07-29 RX ORDER — BISOPROLOL FUMARATE AND HYDROCHLOROTHIAZIDE 2.5; 6.25 MG/1; MG/1
1 TABLET ORAL DAILY
Qty: 90 TABLET | Refills: 3 | Status: SHIPPED | OUTPATIENT
Start: 2022-07-29

## 2022-07-29 RX ORDER — NITROGLYCERIN 0.4 MG/1
0.4 TABLET SUBLINGUAL
Qty: 25 TABLET | Refills: 6 | Status: SHIPPED | OUTPATIENT
Start: 2022-07-29

## 2022-07-29 RX ORDER — BUMETANIDE 1 MG/1
1 TABLET ORAL 2 TIMES DAILY
Qty: 180 TABLET | Refills: 3 | Status: SHIPPED | OUTPATIENT
Start: 2022-07-29 | End: 2023-02-17 | Stop reason: DRUGHIGH
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
## 2022-12-14 NOTE — TELEPHONE ENCOUNTER
Called pt and gave KTS recommendations above. Pt verbalizes understanding and agreeable to plan.     Home

## 2023-02-15 ENCOUNTER — LAB (OUTPATIENT)
Dept: LAB | Facility: HOSPITAL | Age: 83
End: 2023-02-15
Payer: COMMERCIAL

## 2023-02-15 ENCOUNTER — OFFICE VISIT (OUTPATIENT)
Dept: CARDIOLOGY | Facility: CLINIC | Age: 83
End: 2023-02-15
Payer: COMMERCIAL

## 2023-02-15 VITALS
WEIGHT: 180 LBS | BODY MASS INDEX: 28.93 KG/M2 | SYSTOLIC BLOOD PRESSURE: 92 MMHG | DIASTOLIC BLOOD PRESSURE: 58 MMHG | HEART RATE: 82 BPM | OXYGEN SATURATION: 96 % | HEIGHT: 66 IN

## 2023-02-15 DIAGNOSIS — E11.9 TYPE 2 DIABETES MELLITUS WITHOUT COMPLICATION, WITHOUT LONG-TERM CURRENT USE OF INSULIN: ICD-10-CM

## 2023-02-15 DIAGNOSIS — I25.119 CORONARY ARTERY DISEASE INVOLVING NATIVE CORONARY ARTERY OF NATIVE HEART WITH ANGINA PECTORIS: ICD-10-CM

## 2023-02-15 DIAGNOSIS — E78.5 HYPERLIPIDEMIA LDL GOAL <70: ICD-10-CM

## 2023-02-15 DIAGNOSIS — I48.0 PAROXYSMAL ATRIAL FIBRILLATION: ICD-10-CM

## 2023-02-15 DIAGNOSIS — I10 ESSENTIAL HYPERTENSION: ICD-10-CM

## 2023-02-15 DIAGNOSIS — I48.0 PAROXYSMAL ATRIAL FIBRILLATION: Primary | ICD-10-CM

## 2023-02-15 LAB
BASOPHILS # BLD AUTO: 0.05 10*3/MM3 (ref 0–0.2)
BASOPHILS NFR BLD AUTO: 0.6 % (ref 0–1.5)
DEPRECATED RDW RBC AUTO: 40.3 FL (ref 37–54)
EOSINOPHIL # BLD AUTO: 0.06 10*3/MM3 (ref 0–0.4)
EOSINOPHIL NFR BLD AUTO: 0.7 % (ref 0.3–6.2)
ERYTHROCYTE [DISTWIDTH] IN BLOOD BY AUTOMATED COUNT: 12.4 % (ref 12.3–15.4)
HBA1C MFR BLD: 7.8 % (ref 4.8–5.6)
HCT VFR BLD AUTO: 45.5 % (ref 34–46.6)
HGB BLD-MCNC: 15.2 G/DL (ref 12–15.9)
IMM GRANULOCYTES # BLD AUTO: 0.02 10*3/MM3 (ref 0–0.05)
IMM GRANULOCYTES NFR BLD AUTO: 0.2 % (ref 0–0.5)
LYMPHOCYTES # BLD AUTO: 3.23 10*3/MM3 (ref 0.7–3.1)
LYMPHOCYTES NFR BLD AUTO: 37.5 % (ref 19.6–45.3)
MCH RBC QN AUTO: 29.4 PG (ref 26.6–33)
MCHC RBC AUTO-ENTMCNC: 33.4 G/DL (ref 31.5–35.7)
MCV RBC AUTO: 88 FL (ref 79–97)
MONOCYTES # BLD AUTO: 0.88 10*3/MM3 (ref 0.1–0.9)
MONOCYTES NFR BLD AUTO: 10.2 % (ref 5–12)
NEUTROPHILS NFR BLD AUTO: 4.37 10*3/MM3 (ref 1.7–7)
NEUTROPHILS NFR BLD AUTO: 50.8 % (ref 42.7–76)
NRBC BLD AUTO-RTO: 0 /100 WBC (ref 0–0.2)
PLATELET # BLD AUTO: 158 10*3/MM3 (ref 140–450)
PMV BLD AUTO: 10.4 FL (ref 6–12)
RBC # BLD AUTO: 5.17 10*6/MM3 (ref 3.77–5.28)
WBC NRBC COR # BLD: 8.61 10*3/MM3 (ref 3.4–10.8)

## 2023-02-15 PROCEDURE — 93000 ELECTROCARDIOGRAM COMPLETE: CPT | Performed by: NURSE PRACTITIONER

## 2023-02-15 PROCEDURE — 84443 ASSAY THYROID STIM HORMONE: CPT

## 2023-02-15 PROCEDURE — 36415 COLL VENOUS BLD VENIPUNCTURE: CPT

## 2023-02-15 PROCEDURE — 83735 ASSAY OF MAGNESIUM: CPT

## 2023-02-15 PROCEDURE — 85025 COMPLETE CBC W/AUTO DIFF WBC: CPT

## 2023-02-15 PROCEDURE — 99214 OFFICE O/P EST MOD 30 MIN: CPT | Performed by: NURSE PRACTITIONER

## 2023-02-15 PROCEDURE — 83036 HEMOGLOBIN GLYCOSYLATED A1C: CPT

## 2023-02-15 PROCEDURE — 80053 COMPREHEN METABOLIC PANEL: CPT

## 2023-02-15 RX ORDER — LANCETS 28 GAUGE
EACH MISCELLANEOUS 3 TIMES DAILY
COMMUNITY
Start: 2023-02-09

## 2023-02-15 RX ORDER — CALCITRIOL 0.25 UG/1
1 CAPSULE, LIQUID FILLED ORAL DAILY
COMMUNITY
Start: 2023-02-09

## 2023-02-15 RX ORDER — BECLOMETHASONE DIPROPIONATE MONOHYDRATE 42 UG/1
SPRAY, SUSPENSION NASAL
COMMUNITY
Start: 2022-11-21

## 2023-02-15 RX ORDER — BLOOD-GLUCOSE METER
KIT MISCELLANEOUS 3 TIMES DAILY
COMMUNITY
Start: 2023-02-09

## 2023-02-15 RX ORDER — PANTOPRAZOLE SODIUM 40 MG/1
TABLET, DELAYED RELEASE ORAL
COMMUNITY
Start: 2022-12-12

## 2023-02-16 ENCOUNTER — DOCUMENTATION (OUTPATIENT)
Dept: CARDIOLOGY | Facility: CLINIC | Age: 83
End: 2023-02-16
Payer: COMMERCIAL

## 2023-02-16 LAB
ALBUMIN SERPL-MCNC: 4.8 G/DL (ref 3.5–5.2)
ALBUMIN/GLOB SERPL: 1.2 G/DL
ALP SERPL-CCNC: 73 U/L (ref 39–117)
ALT SERPL W P-5'-P-CCNC: 13 U/L (ref 1–33)
ANION GAP SERPL CALCULATED.3IONS-SCNC: 14 MMOL/L (ref 5–15)
AST SERPL-CCNC: 16 U/L (ref 1–32)
BILIRUB SERPL-MCNC: 0.9 MG/DL (ref 0–1.2)
BUN SERPL-MCNC: 50 MG/DL (ref 8–23)
BUN/CREAT SERPL: 20.7 (ref 7–25)
CALCIUM SPEC-SCNC: 10.1 MG/DL (ref 8.6–10.5)
CHLORIDE SERPL-SCNC: 93 MMOL/L (ref 98–107)
CO2 SERPL-SCNC: 30 MMOL/L (ref 22–29)
CREAT SERPL-MCNC: 2.42 MG/DL (ref 0.57–1)
EGFRCR SERPLBLD CKD-EPI 2021: 19.8 ML/MIN/1.73
GLOBULIN UR ELPH-MCNC: 4 GM/DL
GLUCOSE SERPL-MCNC: 190 MG/DL (ref 65–99)
MAGNESIUM SERPL-MCNC: 1.9 MG/DL (ref 1.6–2.4)
POTASSIUM SERPL-SCNC: 3 MMOL/L (ref 3.5–5.2)
PROT SERPL-MCNC: 8.8 G/DL (ref 6–8.5)
SODIUM SERPL-SCNC: 137 MMOL/L (ref 136–145)
TSH SERPL DL<=0.05 MIU/L-ACNC: 0.43 UIU/ML (ref 0.27–4.2)

## 2023-02-16 NOTE — PROGRESS NOTES
I called left a message for the patient to call back.  I was able to review the patient's laboratory testing results:        Hemoglobin A1c 7.8% (was 7% 8 months ago)  Magnesium 1.9  CBC: WBC 8.61, RBC 5.17, hemoglobin 15.2, hematocrit 45.5, MCV 88, MCH 29.4, MCHC 33.4, RDW 12.4, MPV 10.4, platelets 158, neutrophils 50.8, lymphocytes 37.5, monocytes 10.2, eos 0.7, basos 0.6  TSH 0.433  CMP: Glucose 190, BUN 50, creatinine 2.42 (was 1.38 in June 2022), potassium 3, chloride 93, carbon dioxide 30, protein 8.8, albumin 4.8, ALT 13, AST 16, alkaline phosphatase 73, bilirubin 0.9, globulin 4        I recommend that the patient decrease her Bumex to 1 mg daily instead of twice a day.  She also needs to be on Micro-K 20 mEq daily.  She should take 40 mg of Micro-K the next 2 days.  She needs a referral to nephrology due to worsening renal function in the setting of worsening diabetes.  She also needs a repeat BMP and magnesium in 1 week.    Electronically signed by SAMUEL Swan, 02/16/23, 2:19 PM EST.

## 2023-02-17 ENCOUNTER — TELEPHONE (OUTPATIENT)
Dept: CARDIOLOGY | Facility: CLINIC | Age: 83
End: 2023-02-17
Payer: COMMERCIAL

## 2023-02-17 DIAGNOSIS — I10 ESSENTIAL HYPERTENSION: Primary | ICD-10-CM

## 2023-02-17 RX ORDER — BUMETANIDE 1 MG/1
1 TABLET ORAL DAILY
Qty: 90 TABLET | Refills: 0 | Status: SHIPPED | OUTPATIENT
Start: 2023-02-17

## 2023-02-17 RX ORDER — POTASSIUM CHLORIDE 20 MEQ/1
20 TABLET, EXTENDED RELEASE ORAL DAILY
Qty: 90 TABLET | Refills: 0 | Status: SHIPPED | OUTPATIENT
Start: 2023-02-17

## 2023-02-17 NOTE — TELEPHONE ENCOUNTER
Called pt regarding SAMUEL Juan results below. Pt has smart call blocker on phone and I was unable to get through. Called pt's son and discussed KS results and recommendations below. Pt's son verbalizes understanding and agreeable to plan. He will call back with a fax# to send lab orders to so pt can have labs drawn locally.     Per SAMUEL Juan- I recommend that the patient decrease her Bumex to 1 mg daily instead of twice a day.  She also needs to be on Micro-K 20 mEq daily.  She should take 40 mg of Micro-K the next 2 days.  She needs a referral to nephrology due to worsening renal function in the setting of worsening diabetes.  She also needs a repeat BMP and magnesium in 1 week.          Referral faxed to [facility].

## 2023-05-05 RX ORDER — POTASSIUM CHLORIDE 20 MEQ/1
TABLET, EXTENDED RELEASE ORAL
Qty: 90 TABLET | Refills: 0 | Status: SHIPPED | OUTPATIENT
Start: 2023-05-05

## 2023-05-05 RX ORDER — BUMETANIDE 1 MG/1
TABLET ORAL
Qty: 90 TABLET | Refills: 0 | Status: SHIPPED | OUTPATIENT
Start: 2023-05-05

## 2023-05-26 RX ORDER — NITROGLYCERIN 0.4 MG/1
TABLET SUBLINGUAL
Qty: 25 TABLET | Refills: 6 | Status: SHIPPED | OUTPATIENT
Start: 2023-05-26

## 2023-08-04 ENCOUNTER — OFFICE VISIT (OUTPATIENT)
Dept: CARDIOLOGY | Facility: CLINIC | Age: 83
End: 2023-08-04
Payer: COMMERCIAL

## 2023-08-04 ENCOUNTER — LAB (OUTPATIENT)
Dept: LAB | Facility: HOSPITAL | Age: 83
End: 2023-08-04
Payer: MEDICARE

## 2023-08-04 ENCOUNTER — TELEPHONE (OUTPATIENT)
Dept: CARDIOLOGY | Facility: CLINIC | Age: 83
End: 2023-08-04

## 2023-08-04 VITALS
DIASTOLIC BLOOD PRESSURE: 60 MMHG | HEART RATE: 73 BPM | BODY MASS INDEX: 29.27 KG/M2 | SYSTOLIC BLOOD PRESSURE: 140 MMHG | OXYGEN SATURATION: 96 % | HEIGHT: 66 IN | WEIGHT: 182.1 LBS

## 2023-08-04 DIAGNOSIS — I10 ESSENTIAL HYPERTENSION: ICD-10-CM

## 2023-08-04 DIAGNOSIS — I48.0 PAROXYSMAL ATRIAL FIBRILLATION: ICD-10-CM

## 2023-08-04 DIAGNOSIS — I25.119 CORONARY ARTERY DISEASE INVOLVING NATIVE CORONARY ARTERY OF NATIVE HEART WITH ANGINA PECTORIS: ICD-10-CM

## 2023-08-04 DIAGNOSIS — I48.0 PAROXYSMAL ATRIAL FIBRILLATION: Primary | ICD-10-CM

## 2023-08-04 DIAGNOSIS — R09.89 RIGHT CAROTID BRUIT: ICD-10-CM

## 2023-08-04 DIAGNOSIS — E78.5 HYPERLIPIDEMIA LDL GOAL <70: ICD-10-CM

## 2023-08-04 DIAGNOSIS — E11.51 TYPE 2 DIABETES MELLITUS WITH PERIPHERAL VASCULAR DISEASE: ICD-10-CM

## 2023-08-04 PROCEDURE — 85025 COMPLETE CBC W/AUTO DIFF WBC: CPT

## 2023-08-04 PROCEDURE — 83735 ASSAY OF MAGNESIUM: CPT

## 2023-08-04 PROCEDURE — 83036 HEMOGLOBIN GLYCOSYLATED A1C: CPT

## 2023-08-04 PROCEDURE — 36415 COLL VENOUS BLD VENIPUNCTURE: CPT

## 2023-08-04 PROCEDURE — 80053 COMPREHEN METABOLIC PANEL: CPT

## 2023-08-04 PROCEDURE — 93000 ELECTROCARDIOGRAM COMPLETE: CPT | Performed by: NURSE PRACTITIONER

## 2023-08-04 PROCEDURE — 84443 ASSAY THYROID STIM HORMONE: CPT

## 2023-08-04 PROCEDURE — 99214 OFFICE O/P EST MOD 30 MIN: CPT | Performed by: NURSE PRACTITIONER

## 2023-08-04 NOTE — TELEPHONE ENCOUNTER
Caller: Akiko Lange    Relationship: Emergency Contact    Best call back number: [phone]    What is the best time to reach you: ANY     Who are you requesting to speak with (clinical staff, provider,  specific staff member): CLINICAL       What was the call regarding: PT'S DAUGHTER STATES THAT THE PT IS GOING TO TRY TO GET NITROGLYCERIN. HER DAUGHTER STATES THAT SHE DOESN'T NEED THIS MEDICATION BECAUSE HER BLOOD PRESSURE BOTTOMS OUT WHEN SHE TAKES IT. PT'S PCP HAS ACTUALLY TOLD THE PHARMACY THAT SHE CAN'T TAKE THIS MEDICATION. HOWEVER, THE PT STILL WANTS TO TAKE THIS MEDICATION, DUE TO ANXIETY. HER PCP ALSO CALLED A COUPLE OF WEEKS AGO TO MAKE THE OFFICE AWARE. PT HAS BEEN IN THE ED A FEW TIMES NOW DUE TO THIS. PLEASE BE ADVISED THAT PT MAY BECOME UPSET.    Is it okay if the provider responds through MyChart: NO

## 2023-08-05 LAB
ALBUMIN SERPL-MCNC: 4 G/DL (ref 3.5–5.2)
ALBUMIN/GLOB SERPL: 1.1 G/DL
ALP SERPL-CCNC: 64 U/L (ref 39–117)
ALT SERPL W P-5'-P-CCNC: 10 U/L (ref 1–33)
ANION GAP SERPL CALCULATED.3IONS-SCNC: 12.1 MMOL/L (ref 5–15)
AST SERPL-CCNC: 14 U/L (ref 1–32)
BASOPHILS # BLD AUTO: 0.04 10*3/MM3 (ref 0–0.2)
BASOPHILS NFR BLD AUTO: 0.7 % (ref 0–1.5)
BILIRUB SERPL-MCNC: 0.3 MG/DL (ref 0–1.2)
BUN SERPL-MCNC: 41 MG/DL (ref 8–23)
BUN/CREAT SERPL: 26.1 (ref 7–25)
CALCIUM SPEC-SCNC: 9.6 MG/DL (ref 8.6–10.5)
CHLORIDE SERPL-SCNC: 100 MMOL/L (ref 98–107)
CO2 SERPL-SCNC: 28.9 MMOL/L (ref 22–29)
CREAT SERPL-MCNC: 1.57 MG/DL (ref 0.57–1)
DEPRECATED RDW RBC AUTO: 38.7 FL (ref 37–54)
EGFRCR SERPLBLD CKD-EPI 2021: 33 ML/MIN/1.73
EOSINOPHIL # BLD AUTO: 0.14 10*3/MM3 (ref 0–0.4)
EOSINOPHIL NFR BLD AUTO: 2.5 % (ref 0.3–6.2)
ERYTHROCYTE [DISTWIDTH] IN BLOOD BY AUTOMATED COUNT: 12 % (ref 12.3–15.4)
GLOBULIN UR ELPH-MCNC: 3.8 GM/DL
GLUCOSE SERPL-MCNC: 162 MG/DL (ref 65–99)
HBA1C MFR BLD: 7.1 % (ref 4.8–5.6)
HCT VFR BLD AUTO: 41.4 % (ref 34–46.6)
HGB BLD-MCNC: 13.9 G/DL (ref 12–15.9)
IMM GRANULOCYTES # BLD AUTO: 0.02 10*3/MM3 (ref 0–0.05)
IMM GRANULOCYTES NFR BLD AUTO: 0.4 % (ref 0–0.5)
LYMPHOCYTES # BLD AUTO: 2.32 10*3/MM3 (ref 0.7–3.1)
LYMPHOCYTES NFR BLD AUTO: 41.7 % (ref 19.6–45.3)
MAGNESIUM SERPL-MCNC: 1.8 MG/DL (ref 1.6–2.4)
MCH RBC QN AUTO: 29.6 PG (ref 26.6–33)
MCHC RBC AUTO-ENTMCNC: 33.6 G/DL (ref 31.5–35.7)
MCV RBC AUTO: 88.1 FL (ref 79–97)
MONOCYTES # BLD AUTO: 0.64 10*3/MM3 (ref 0.1–0.9)
MONOCYTES NFR BLD AUTO: 11.5 % (ref 5–12)
NEUTROPHILS NFR BLD AUTO: 2.4 10*3/MM3 (ref 1.7–7)
NEUTROPHILS NFR BLD AUTO: 43.2 % (ref 42.7–76)
NRBC BLD AUTO-RTO: 0 /100 WBC (ref 0–0.2)
PLATELET # BLD AUTO: 151 10*3/MM3 (ref 140–450)
PMV BLD AUTO: 10.5 FL (ref 6–12)
POTASSIUM SERPL-SCNC: 3.6 MMOL/L (ref 3.5–5.2)
PROT SERPL-MCNC: 7.8 G/DL (ref 6–8.5)
RBC # BLD AUTO: 4.7 10*6/MM3 (ref 3.77–5.28)
SODIUM SERPL-SCNC: 141 MMOL/L (ref 136–145)
TSH SERPL DL<=0.05 MIU/L-ACNC: 0.45 UIU/ML (ref 0.27–4.2)
WBC NRBC COR # BLD: 5.56 10*3/MM3 (ref 3.4–10.8)

## 2023-08-07 ENCOUNTER — TELEPHONE (OUTPATIENT)
Dept: CARDIOLOGY | Facility: CLINIC | Age: 83
End: 2023-08-07

## 2023-08-07 ENCOUNTER — DOCUMENTATION (OUTPATIENT)
Dept: CARDIOLOGY | Facility: CLINIC | Age: 83
End: 2023-08-07
Payer: COMMERCIAL

## 2023-08-07 NOTE — TELEPHONE ENCOUNTER
Caller: AnisaAkiko    Relationship: Emergency Contact    Best call back number: [phone]    Who are you requesting to speak with (clinical staff, provider,  specific staff member): ANYONE    What was the call regarding: PATIENTS DAUGHTER REQUESTING THAT LABS DONE ON 8.4.23 BE FAXED OVER TO PATIENTS PCP, SAMUEL MORAN.

## 2023-08-07 NOTE — PROGRESS NOTES
Patient aware of results. Verbalized understanding. I have left a message with [facility] to return my call regarding the referral that was sent back in Feb 2023.

## 2023-08-07 NOTE — PROGRESS NOTES
The patient's magnesium and thyroid function were normal.  Glucose was elevated at 162 and creatinine was 1.57.  With the decrease in Bumex her kidney function was better than in February when her creatinine was 2.42.  I would encourage her to stay hydrated.  It looks like compared to other labs her baseline creatinine is around 1.4.  Liver function, blood counts, and other electrolytes were acceptable.  Hemoglobin A1c was 7.1% which was better than 5 months ago when it was 7.8%.  As soon as I get her carotid duplex results and E patch results back, I will let her know.  Hopefully heart/valve clinic can contact her to get the 24-hour ambulatory blood pressure monitor soon.I will have Heike/JOSSIE Aguilar call the patient back with results/recommendations.     Electronically signed by SAMUEL Del Rio, 08/07/23, 8:27 AM EDT.

## 2023-08-11 ENCOUNTER — DOCUMENTATION (OUTPATIENT)
Dept: CARDIOLOGY | Facility: CLINIC | Age: 83
End: 2023-08-11
Payer: COMMERCIAL

## 2023-08-11 NOTE — PROGRESS NOTES
I was able to review the patient's Holter monitor results from 6 months ago that she brought back to office with her last week.  She had rare PVCs and PAC burden was 9.4%.  She had  one 16 beat episode of SVT.  Would continue her current cardiac medications.  As soon as I get her 24-hour ambulatory blood pressure monitor and carotid duplex results back, I will let her know.  Hopefully she has been able to obtain a new blood pressure monitor and can monitor her blood pressure twice a day and call back in 1.5-2 weeks with readings.I will have Heike/JOSSIE Aguilar call the patient back with results/recommendations.     Electronically signed by SAMUEL Del Rio, 08/11/23, 8:58 AM EDT.

## 2023-08-28 ENCOUNTER — HOSPITAL ENCOUNTER (OUTPATIENT)
Dept: CARDIOLOGY | Facility: HOSPITAL | Age: 83
Discharge: HOME | End: 2023-08-28
Payer: MEDICARE

## 2023-08-28 ENCOUNTER — CLINICAL SUPPORT (OUTPATIENT)
Dept: CARDIOLOGY | Facility: HOSPITAL | Age: 83
End: 2023-08-28
Payer: COMMERCIAL

## 2023-08-28 VITALS — HEIGHT: 67 IN | WEIGHT: 182 LBS | BODY MASS INDEX: 28.56 KG/M2

## 2023-08-28 DIAGNOSIS — I10 ESSENTIAL HYPERTENSION: ICD-10-CM

## 2023-08-28 LAB
BH CV XLRA MEAS LEFT DIST CCA EDV: 13.3 CM/SEC
BH CV XLRA MEAS LEFT DIST CCA PSV: 50.6 CM/SEC
BH CV XLRA MEAS LEFT DIST ICA EDV: 39.7 CM/SEC
BH CV XLRA MEAS LEFT DIST ICA PSV: 139 CM/SEC
BH CV XLRA MEAS LEFT ICA/CCA RATIO: 1.9
BH CV XLRA MEAS LEFT MID CCA EDV: 16.5 CM/SEC
BH CV XLRA MEAS LEFT MID CCA PSV: 64.2 CM/SEC
BH CV XLRA MEAS LEFT MID ICA EDV: 33.9 CM/SEC
BH CV XLRA MEAS LEFT MID ICA PSV: 157 CM/SEC
BH CV XLRA MEAS LEFT PROX CCA EDV: 14.7 CM/SEC
BH CV XLRA MEAS LEFT PROX CCA PSV: 85.8 CM/SEC
BH CV XLRA MEAS LEFT PROX ECA EDV: 20.3 CM/SEC
BH CV XLRA MEAS LEFT PROX ECA PSV: 137 CM/SEC
BH CV XLRA MEAS LEFT PROX ICA EDV: 32.1 CM/SEC
BH CV XLRA MEAS LEFT PROX ICA PSV: 123.1 CM/SEC
BH CV XLRA MEAS LEFT PROX SCLA PSV: 180.5 CM/SEC
BH CV XLRA MEAS LEFT VERTEBRAL A EDV: 15.9 CM/SEC
BH CV XLRA MEAS LEFT VERTEBRAL A PSV: 77.9 CM/SEC
BH CV XLRA MEAS RIGHT DIST CCA EDV: 9.7 CM/SEC
BH CV XLRA MEAS RIGHT DIST CCA PSV: 47.4 CM/SEC
BH CV XLRA MEAS RIGHT DIST ICA EDV: 38.5 CM/SEC
BH CV XLRA MEAS RIGHT DIST ICA PSV: 139 CM/SEC
BH CV XLRA MEAS RIGHT ICA/CCA RATIO: 1.8
BH CV XLRA MEAS RIGHT MID CCA EDV: 14.1 CM/SEC
BH CV XLRA MEAS RIGHT MID CCA PSV: 80.7 CM/SEC
BH CV XLRA MEAS RIGHT MID ICA EDV: 30.1 CM/SEC
BH CV XLRA MEAS RIGHT MID ICA PSV: 149 CM/SEC
BH CV XLRA MEAS RIGHT PROX CCA EDV: 14.9 CM/SEC
BH CV XLRA MEAS RIGHT PROX CCA PSV: 95.6 CM/SEC
BH CV XLRA MEAS RIGHT PROX ECA EDV: 15.4 CM/SEC
BH CV XLRA MEAS RIGHT PROX ECA PSV: 148 CM/SEC
BH CV XLRA MEAS RIGHT PROX ICA EDV: 35 CM/SEC
BH CV XLRA MEAS RIGHT PROX ICA PSV: 143 CM/SEC
BH CV XLRA MEAS RIGHT PROX SCLA PSV: 196.2 CM/SEC
BH CV XLRA MEAS RIGHT VERTEBRAL A EDV: 7.5 CM/SEC
BH CV XLRA MEAS RIGHT VERTEBRAL A PSV: 26.3 CM/SEC
LEFT ARM BP: NORMAL MMHG
RIGHT ARM BP: NORMAL MMHG

## 2023-08-28 PROCEDURE — 93880 EXTRACRANIAL BILAT STUDY: CPT | Performed by: INTERNAL MEDICINE

## 2023-08-28 PROCEDURE — 93786 AMBL BP MNTR W/SW REC ONLY: CPT

## 2023-08-28 PROCEDURE — 93880 EXTRACRANIAL BILAT STUDY: CPT

## 2023-08-28 NOTE — PROGRESS NOTES
Lino Guerrero  : 1940  MRN: [id number]  Today's Date: 23    Bedtime __________    I woke up at _______      [facility]  [address] [place] [address], [address], [place] [address]  [phone]    During the day, the monitor will pump air and the cuff will inflate approximately every twenty minutes.  In the evening, the pump-up interval changes to every thirty minutes.  In the late evening (9:00 p.m.--10:00 p.m.), the cuff will inflate every hour until 8:00 a.m. the following morning when the twenty-minute interval begins again.    When you feel the cuff inflating, stop what you are doing, straighten your arm, and be still.  If while the cuff is inflated, your arm is bent or there is too much movement, the cuff will re-inflate and attempt another reading.  When the cuff deflates, resume your normal activity.    The monitor is self-contained and records and displays all readings.  Every time the cuff deflates, your blood pressure and heart rate will be displayed twice.    If you bathe or change clothing, remove the monitor.  It is difficult to re-wrap or re-apply the cuff; before removing it, make sure someone is available to  help you.  Whether the cuff is on your left or right arm, the gray tube must be directly above the bend of your elbow.    If the cuff inflates when it is not wrapped around your arm, let it deflate and disconnect it from the black tube, push out any remaining air, reconnect the tubing, and wrap it around your arm again.  The monitor will resume readings at the next proper time.    After wearing the cuff for twenty-four hours, turn the monitor off by holding the button for several seconds, or by removing the batteries.            BPMONITORINSTRUCTIONS 8                  Ambulatory Blood Pressure Monitor Patient Agreement    I, Lino Guerrero, 1940, [id number] assume full responsibility for the safekeeping and care of the monitor while it  is in my possession.      I have been advised that it is not waterproof and that any water that comes in contact with the monitor may cause damage that could require the unit to be replaced.    Until I return the monitor and its attachments to [facility], I will assume responsibility for any damage to the monitor due to neglect or misuse of same.    I understand that I am responsible for returning the monitor or I will be responsible for all costs for replacing the equipment.  Failure to return the monitor will result in a replacement charge of $1800.00 which will be billed to me five business days following the date of hookup.    Unless instructed otherwise, I will wear the monitor for 24 hours.    I was given the opportunity to ask questions and left the office with the device instructions.    I will return the monitor no later than 008/28/2023 to:    [facility], [address] [place] [address], [address], [place] [address]    Date of Hookup: 08/28/23    Ordered by: SAMUEL Juan    Hooked up by: Stacy Mcdermott CMA Cuff placed on left arm.    Serial Number: [id number]    Termination Date: 08/29/2023  Time: 1226    Patient or Guardian Signature: ______________________, 08/28/23    Date Monitor Returned: __________________            BPMONITORINSTRUCTIONS 8.12.2019

## 2023-08-30 ENCOUNTER — DOCUMENTATION (OUTPATIENT)
Dept: CARDIOLOGY | Facility: CLINIC | Age: 83
End: 2023-08-30
Payer: COMMERCIAL

## 2023-08-30 NOTE — PROGRESS NOTES
Carotid duplex showing less than 50% bilateral carotid artery stenosis.  Still awaiting 24-hour ambulatory blood pressure monitor results.    Electronically signed by SAMUEL Del Rio, 08/30/23, 9:13 AM EDT.

## 2023-09-11 ENCOUNTER — TELEPHONE (OUTPATIENT)
Dept: CARDIOLOGY | Facility: HOSPITAL | Age: 83
End: 2023-09-11
Payer: COMMERCIAL

## 2023-09-11 NOTE — TELEPHONE ENCOUNTER
Pt still has the 24 hour blood pressure monitor. I will be sending out a envelope so pt can fed ex monitor back to us.

## 2023-09-15 ENCOUNTER — TELEPHONE (OUTPATIENT)
Dept: CARDIOLOGY | Facility: CLINIC | Age: 83
End: 2023-09-15
Payer: COMMERCIAL

## 2023-09-15 RX ORDER — METOLAZONE 2.5 MG/1
2.5 TABLET ORAL DAILY PRN
Qty: 90 TABLET | Refills: 3 | Status: SHIPPED | OUTPATIENT
Start: 2023-09-15

## 2023-09-15 NOTE — TELEPHONE ENCOUNTER
Caller: Akiko Lange    Relationship: Emergency Contact    Best call back number: [phone]    Requested Prescriptions:   Requested Prescriptions     Pending Prescriptions Disp Refills    metOLazone (ZAROXOLYN) 2.5 MG tablet 90 tablet 3     Sig: Take 1 tablet by mouth Daily As Needed (severe swelling and/or shortness of breath).        Pharmacy where request should be sent: [facility] [place], [place] - [address] [place]  - [phone] [facility] [phone]      Last office visit with prescribing clinician: 8/4/2023   Last telemedicine visit with prescribing clinician: Visit date not found   Next office visit with prescribing clinician: 11/10/2023     Additional details provided by patient: PT HAS BEEN OUT OF THIS MEDICATION FOR A COUPLE OF DAYS.     Does the patient have less than a 3 day supply:  [x] Yes  [] No    Would you like a call back once the refill request has been completed: [x] Yes [] No    If the office needs to give you a call back, can they leave a voicemail: [x] Yes [] No    Alfredo Correa Rep   09/15/23 13:48 EDT

## 2023-09-28 ENCOUNTER — DOCUMENTATION (OUTPATIENT)
Dept: CARDIOLOGY | Facility: CLINIC | Age: 83
End: 2023-09-28
Payer: COMMERCIAL

## 2023-09-28 NOTE — PROGRESS NOTES
I was able to review the patient's 24-hour ambulatory blood pressure monitor results.  There were a lot of error measurements but of the blood pressure readings that were recorded, the patient's average awake blood pressure was 121/54 mmHg, 85 bpm, average asleep blood pressure was 123/55 mmHg, 79 bpm, and average overall blood pressure was 121/54 mmHg, 84 bpm.  The patient's recent carotid duplex was acceptable with less than 50% stenosis bilaterally.  I would recommend for her to monitor her blood pressure once a day.  I also recommend the patient stay hydrated.  The patient's blood pressures are stable.  She does NOT need to be taking her nitroglycerin sublingual for her blood pressure.  She has an upcoming appointment with me on 11/10/2023.  I would recommend to continue her other cardiac medications and I will see her at her next appointment. I will have Heike/JOSSIE Aguilar call the patient back with results/recommendations.     Electronically signed by SAMUEL Del Rio, 09/28/23, 2:51 PM EDT.

## 2023-10-24 ENCOUNTER — TELEPHONE (OUTPATIENT)
Dept: CARDIOLOGY | Facility: CLINIC | Age: 83
End: 2023-10-24
Payer: COMMERCIAL

## 2023-10-24 DIAGNOSIS — E11.9 TYPE 2 DIABETES MELLITUS WITHOUT COMPLICATION, WITHOUT LONG-TERM CURRENT USE OF INSULIN: ICD-10-CM

## 2023-10-24 NOTE — TELEPHONE ENCOUNTER
Caller: [facility] - [place] - [address]  - [phone]  - [phone]    Relationship: Pharmacy      Requested Prescriptions:   Requested Prescriptions     Pending Prescriptions Disp Refills    empagliflozin (Jardiance) 10 MG tablet tablet 90 tablet 3     Sig: Take 1 tablet by mouth Daily.        Pharmacy where request should be sent: [facility]      Last office visit with prescribing clinician: 8/4/2023   Last telemedicine visit with prescribing clinician: Visit date not found   Next office visit with prescribing clinician: 11/10/2023       Does the patient have less than a 3 day supply:  [x] Yes  [] No    Would you like a call back once the refill request has been completed: [] Yes [x] No    If the office needs to give you a call back, can they leave a voicemail: [] Yes [x] No    Alfredo Siddiqui Rep   10/24/23 10:45 EDT

## 2023-11-05 NOTE — PROGRESS NOTES
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     CURRENT HEALTHCARE PROVIDER:  SAMUEL Wayne  FORMER PHYSICIAN: James Mcfarlane DO   GASTROENTEROLOGIST: Tanner Rdz MD   ENDOCRINOLOGIST: Antonio Barrera MD   INTERVENTIONAL CARDIOLOGIST:  Vic Marks MD, [facility]  NEPHROLOGIST: Loree Tong MD.    Chief Complaint:   Chief Complaint   Patient presents with    paf    Hypertension    Coronary Artery Disease    hld     Problem List:  Remote progressive severe hypertension, 1997:  Acceptable renal nuclear blood flow scan/renal ultrasound/abnormal acceptable abdominal CT scan, April 1997.   Recurrent progressive hypertensive blood pressure readings/acceptable selective bilateral renal arteriography, May 2011.   Remote acceptable blood pressure control with recent progressive hypertensive blood pressure readings and apparent abnormal renal function with Nephrology referral recommended - data deficit, summer-autumn 2019.  24-hour ambulatory blood pressure monitor September 2023 results showed there were a lot of error measurements but of the blood pressure readings that were recorded, the patient's average awake blood pressure was 121/54 mmHg, 85 bpm, average asleep blood pressure was 123/55 mmHg, 79 bpm, and average overall blood pressure was 121/54 mmHg, 84 bpm.   Intermittent paroxysmal atrial fibrillation, December 2009, with subsequent chronic oral anticoagulation x1 year.  Recurrent palpitations February 2023, Holter monitor February 2023 showed PAC and PVC burden 9.4%, 116 beat episode of SVT  Noninsulin dependent type 2 diabetes mellitus (hemoglobin A1c 5.8%), July 2011, 7% June 2022, 7.8% February 2023, 7.1% August 2023.  Stage 3 chronic kidney disease - data deficit, 2020   Remote hiatal hernia/probable GERD syndrome with remote EGD, April 1997.   Remote peptic ulcer disease with recurrent  intermittent gastritis.   Osteoarthritis, predominantly involving the knees.   Peripheral vascular disease:  Asymptomatic bilateral carotid bruits with remote acceptable carotid duplex studies, April 1997.  Remote abnormal carotid duplex study with moderate right internal carotid artery stenosis (40% to 60%) with mild left internal carotid artery stenosis (20% to 40%), July 2011.   Abnormal carotid duplex study with bilateral moderate carotid artery atherosclerotic plaque/stenoses, January 2015, without focal motor-sensory changes.   Carotid duplex August 2023: Less than 50% bilateral carotid artery stenosis  Remote additional operations  Bilateral benign breast cyst removal, 1985.   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   Remote colon polypectomy with incidental findings  Diverticulosis, August 1995.   Chronic hypothyroidism/replacement therapy with elevated serum TSH, October 2019.  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 Remote progressive CCS class III-IV chest pain syndrome with mild nonobstructive coronary atherosclerosis and mild reduction in LV function (LVEF 0.52), May 2001, with subsequent abnormal EGD demonstrating gastritis/treatment with resolution of symptoms.   Acceptable combination Doppler echocardiogram with mild concentric LVH, LVEF (0.55), March 2006.   Remote prolonged chest pain syndrome with observational stay ([facility]), June 2011, with subsequent diagnostic coronary angiography demonstrating moderate branch vessel coronary artery disease with acceptable flow wire assessment and nominal LV function, LVEF (0.60) with continued medical therapy felt warranted, July 2011.   Probable residual CCS class I angina pectoris with recent progressive  atypical chest pain syndrome/normal EKG, August 2012, January 2013, May 2013.   Abnormal hybrid PET cardiac scan demonstrating small area of reversible LAD ischemia with prominent diffuse ischemic ST-T changes and acceptable preserved systolic function, 06/04/2013, with patient refusing further intervention with diagnostic coronary angiography 06/26/2013.  Echocardiogram showing an estimated EF of 0.45 to 0.50 with moderate tricuspid regurgitation and mild concentric left ventricular hypertrophy, 10/17/2014.   Non-ST elevation MI with left heart catheterization showing nonobstructive single vessel coronary artery disease, 50% ostial stenosis of the first diagonal branch, left anterior descending coronary artery, otherwise, no hemodynamically significant coronary artery disease and estimated EF of 0.35 with features of Takotsubo cardiomyopathy, 10/15/2014, by Dr. Marks.   Residual class I symptoms with acceptable echocardiogram, February 2016.  CCS class 2 chest pain/NYHA class II dyspnea December 2016  Left heart catheterization, 1/5/2017; no evidence of hemodynamically significant coronary artery disease, mild nonobstructive plaque in the LAD and first diagonal noted, LVEF 0.55, arterial hypertension, otherwise normal hemodynamics, medical therapy and risk factor management recommended  Left heart catheterization June 2022 showing mild CAD, normal EF, normal LV filling pressure  Echocardiogram 6/16/2022: LVEF 60%, RVSP less than 35 mmHg, aortic valve exhibits mild to moderate sclerosis, normal RV cavity size, wall thickness, systolic function and septal motion noted  [facility] ED visit for chest pain July 2023 with apparent unremarkable [id number] deficit  Residual class I symptoms, October 2019, May 2020, July 2021, February 2022, February 2023, November 2023  Intermittent tinnitus, AD, recurrent, October 2013.   Remote abnormal uterine bleeding with apparent abnormal uterine biopsy with apparent uterine  cancer, stage and type unknown-data deficit, summer 2005, with subsequent SERG/BSO with apparent negative lymph node sampling for well differentiated endometrial carcinoma, September 2005.   Chronic tobacco use with abnormal chest x-ray with discontinued tobacco use, autumn 2011.   GERD/gastritis:  Abnormal EGD, May 2013.   Initiation of Dexilant therapy, January 2013, with continuation after EGD, May 2013     No Known Allergies    Current Outpatient Medications   Medication Instructions    atorvastatin (LIPITOR) 20 mg, Oral, Daily    Beconase AQ 42 MCG/SPRAY nasal spray INSTILL ONE SPRAY IN EACH NOSTRIL ONCE DAILY AS NEEDED    bisoprolol (ZEBeta) 5 MG tablet TAKE 1/2 TABLET BY MOUTH IN THE MORNING    Blood Gluc Meter Disp-Strips device 1 Device, Does not apply, 4 Times Daily Before Meals & Nightly PRN    bumetanide (BUMEX) 1 MG tablet TAKE 1 TABLET ONCE DAILY    calcitriol (ROCALTROL) 0.25 MCG capsule 1 tablet, Oral, Daily    clopidogrel (PLAVIX) 75 mg, Oral, Daily    Diclofenac Sodium (VOLTAREN) 1 % gel gel APPLY FOUR grams TO AFFECTED AREA FOUR TIMES DAILY AS DIRECTED    Dulera 100-5 MCG/ACT inhaler INHALE TWO PUFFS BY MOUTH TWICE DAILY (RINSE MOUTH AFTER EACH USE)    empagliflozin (JARDIANCE) 10 mg, Oral, Daily    FREESTYLE LITE test strip 3 Times Daily, as directed    glipizide (GLUCOTROL XL) 5 mg, Oral, Daily    isosorbide mononitrate (IMDUR) 120 mg, Oral, Daily    Lancets (freestyle) lancets 3 Times Daily, as directed    levothyroxine (SYNTHROID, LEVOTHROID) 150 mcg, Oral, Daily    lubiprostone (AMITIZA) 24 mcg, Oral, Daily With Breakfast    metOLazone (ZAROXOLYN) 2.5 mg, Oral, Daily PRN    pantoprazole (PROTONIX) 40 MG EC tablet TAKE 1 TABLET BY MOUTH ONCE DAILY FOR heartburn    potassium chloride (K-DUR,KLOR-CON) 20 MEQ CR tablet TAKE 2 TABLETS ONCE DAILY FOR first two DAYS ONLY THEN TAKE 1 TABLET ONCE DAILY thereafter    Tradjenta 5 mg, Oral, Every Morning         HISTORY OF PRESENT ILLNESS:  The patient is  here for 3-month follow-up. She had been inappropriately using nitroglycerin sublingual whenever her blood pressure was low and says that it raises her blood pressure.  She was informed multiple times that it does the opposite of that and lowers her blood pressure even farther.   She wore a Holter monitor in February 2023 but did not turn it back in until August 2023 when she brought it back with her to her appointment. Holter monitor February 2023 showed PAC and PVC burden 9.4%, 116 beat episode of SVT. Carotid duplex August 2023 showed less than 50% bilateral carotid artery stenosis. 24-hour ambulatory blood pressure monitor results September 2023 showed there were a lot of error measurements but of the blood pressure readings that were recorded, the patient's average awake blood pressure was 121/54 mmHg, 85 bpm, average asleep blood pressure was 123/55 mmHg, 79 bpm, and average overall blood pressure was 121/54 mmHg, 84 bpm.  The patient says that since she has come off of some of her pills (nitroglycerin sublingual?)  her blood pressures have been normal in the 120s-130s and she has not felt dizzy or lightheaded.  Her heart rates are in the 60s at home.  She ambulates with a walker because she has gait disturbances.  She denies any chest pain, shortness of breath, and only occasionally will feel palpitations but they only last for a second or 2 and then go away.      ROS   All other systems reviewed and otherwise negative.    Procedures       Objective:       Vitals:    11/10/23 1432 11/10/23 1437   BP: 138/78 120/78   BP Location: Right arm Right arm   Patient Position: Sitting Standing   Pulse: 55 53   SpO2: 100%    Weight: 84.6 kg (186 lb 6.4 oz)      Body mass index is 29.26 kg/m².  Last weight August 2023 was 182 pounds  Constitutional:       Appearance: Healthy appearance. Not in distress.   Neck:      Vascular: Carotid bruit (R>L) present. No JVR. JVD normal.   Pulmonary:      Effort: Pulmonary effort is  normal.      Breath sounds: Normal breath sounds. No wheezing. No rhonchi. No rales.   Chest:      Chest wall: Not tender to palpatation.   Cardiovascular:      PMI at left midclavicular line. Normal rate. Irregular rhythm. Normal S1. Normal S2.       Murmurs: There is a grade 2/6 systolic murmur at the URSB, radiating to the neck.      No gallop.  No click. No rub.   Pulses:     Intact distal pulses.   Edema:     Peripheral edema absent.   Abdominal:      General: Bowel sounds are normal.      Palpations: Abdomen is soft.      Tenderness: There is no abdominal tenderness.   Musculoskeletal: Normal range of motion.         General: No tenderness. Skin:     General: Skin is warm and dry.   Neurological:      General: No focal deficit present.      Mental Status: Alert and oriented to person, place and time.           Lab Review:   Lab Results   Component Value Date    GLUCOSE 162 (H) 08/04/2023    BUN 41 (H) 08/04/2023    CREATININE 1.57 (H) 08/04/2023    EGFRIFNONA 40 (L) 01/05/2017    BCR 26.1 (H) 08/04/2023    CO2 28.9 08/04/2023    CALCIUM 9.6 08/04/2023    ALBUMIN 4.0 08/04/2023    AST 14 08/04/2023    ALT 10 08/04/2023       Lab Results   Component Value Date    WBC 5.56 08/04/2023    HGB 13.9 08/04/2023    HCT 41.4 08/04/2023    MCV 88.1 08/04/2023     08/04/2023       Lab Results   Component Value Date    HGBA1C 7.10 (H) 08/04/2023       Lab Results   Component Value Date    TSH 0.451 08/04/2023       Lab Results   Component Value Date    CHOL 150 06/15/2022    CHOL 154 01/05/2017     Lab Results   Component Value Date    TRIG 142 06/15/2022    TRIG 83 01/05/2017     Lab Results   Component Value Date    HDL 62 (H) 06/15/2022    HDL 69 (H) 01/05/2017     Lab Results   Component Value Date    LDL 64 06/15/2022    LDL 59 01/05/2017           Lab Results   Component Value Date     (H) 07/23/2014     Advance Care Planning   ACP discussion was held with the patient during this visit. Patient does not  have an advance directive, declines further assistance.              Assessment:     Overall continued acceptable course with no new interim cardiopulmonary complaints with acceptable functional status on limited activity. We will defer additional diagnostic or therapeutic intervention from a cardiac perspective at this time.      Diagnosis Plan   1. Coronary artery disease involving native coronary artery of native heart with angina pectoris  No recurrent angina pectoris or CHF on current activity schedule; continue current treatment       2. Essential hypertension  Controlled, continue current cardiac medications      3. Hyperlipidemia LDL goal <70  No new labs to review, continue heart healthy diet and physical activity as tolerated, continue atorvastatin             Plan:         Patient to continue current medications and close follow up with the above providers.  Tentative cardiology follow up in May 2024 or patient may return sooner PRN.    Electronically signed by SAMUEL Del Rio, 11/10/23, 3:47 PM EST.

## 2023-11-10 ENCOUNTER — OFFICE VISIT (OUTPATIENT)
Dept: CARDIOLOGY | Facility: CLINIC | Age: 83
End: 2023-11-10
Payer: COMMERCIAL

## 2023-11-10 VITALS
DIASTOLIC BLOOD PRESSURE: 78 MMHG | HEART RATE: 53 BPM | OXYGEN SATURATION: 100 % | SYSTOLIC BLOOD PRESSURE: 120 MMHG | WEIGHT: 186.4 LBS | BODY MASS INDEX: 29.26 KG/M2

## 2023-11-10 DIAGNOSIS — E78.5 HYPERLIPIDEMIA LDL GOAL <70: ICD-10-CM

## 2023-11-10 DIAGNOSIS — I25.119 CORONARY ARTERY DISEASE INVOLVING NATIVE CORONARY ARTERY OF NATIVE HEART WITH ANGINA PECTORIS: Primary | ICD-10-CM

## 2023-11-10 DIAGNOSIS — I10 ESSENTIAL HYPERTENSION: ICD-10-CM

## 2023-11-10 PROCEDURE — 99214 OFFICE O/P EST MOD 30 MIN: CPT | Performed by: NURSE PRACTITIONER

## 2023-11-10 RX ORDER — BISOPROLOL FUMARATE 5 MG/1
TABLET, FILM COATED ORAL
COMMUNITY
Start: 2023-10-23

## 2024-05-08 NOTE — PROGRESS NOTES
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     CURRENT HEALTHCARE PROVIDER:  SAMUEL Wayne  FORMER PHYSICIAN: James Mcfarlane DO   GASTROENTEROLOGIST: Tanner Rdz MD   ENDOCRINOLOGIST: Antonio Barrera MD   INTERVENTIONAL CARDIOLOGIST:  Vic Marks MD, [facility]  NEPHROLOGIST: Loree Tong MD.    Chief Complaint:   Chief Complaint   Patient presents with    Coronary artery disease involving native coronary artery of     Problem List:  Remote progressive severe hypertension, 1997:  Acceptable renal nuclear blood flow scan/renal ultrasound/abnormal acceptable abdominal CT scan, April 1997.   Recurrent progressive hypertensive blood pressure readings/acceptable selective bilateral renal arteriography, May 2011.   Remote acceptable blood pressure control with recent progressive hypertensive blood pressure readings and apparent abnormal renal function with Nephrology referral recommended - data deficit, summer-autumn 2019.  24-hour ambulatory blood pressure monitor September 2023 results showed there were a lot of error measurements but of the blood pressure readings that were recorded, the patient's average awake blood pressure was 121/54 mmHg, 85 bpm, average asleep blood pressure was 123/55 mmHg, 79 bpm, and average overall blood pressure was 121/54 mmHg, 84 bpm.   Intermittent paroxysmal atrial fibrillation, December 2009, with subsequent chronic oral anticoagulation x1 year.  Recurrent palpitations February 2023, Holter monitor February 2023 showed PAC and PVC burden 9.4%, 116 beat episode of SVT  Noninsulin dependent type 2 diabetes mellitus (hemoglobin A1c 5.8%), July 2011, 7% June 2022, 7.8% February 2023, 7.1% August 2023.  Stage 3 chronic kidney disease - data deficit, 2020   Remote hiatal hernia/probable GERD syndrome with remote EGD, April 1997.   Remote peptic ulcer disease with recurrent  intermittent gastritis.   Osteoarthritis, predominantly involving the knees.   Peripheral vascular disease:  Asymptomatic bilateral carotid bruits with remote acceptable carotid duplex studies, April 1997.  Remote abnormal carotid duplex study with moderate right internal carotid artery stenosis (40% to 60%) with mild left internal carotid artery stenosis (20% to 40%), July 2011.   Abnormal carotid duplex study with bilateral moderate carotid artery atherosclerotic plaque/stenoses, January 2015, without focal motor-sensory changes.   Carotid duplex August 2023: Less than 50% bilateral carotid artery stenosis  Remote additional operations  Bilateral benign breast cyst removal, 1985.   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   Remote colon polypectomy with incidental findings  Diverticulosis, August 1995.   Chronic hypothyroidism/replacement therapy with elevated serum TSH, October 2019.  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 Remote progressive CCS class III-IV chest pain syndrome with mild nonobstructive coronary atherosclerosis and mild reduction in LV function (LVEF 0.52), May 2001, with subsequent abnormal EGD demonstrating gastritis/treatment with resolution of symptoms.   Acceptable combination Doppler echocardiogram with mild concentric LVH, LVEF (0.55), March 2006.   Remote prolonged chest pain syndrome with observational stay ([facility]), June 2011, with subsequent diagnostic coronary angiography demonstrating moderate branch vessel coronary artery disease with acceptable flow wire assessment and nominal LV function, LVEF (0.60) with continued medical therapy felt warranted, July 2011.   Probable residual CCS class I angina pectoris with recent progressive  atypical chest pain syndrome/normal EKG, August 2012, January 2013, May 2013.   Abnormal hybrid PET cardiac scan demonstrating small area of reversible LAD ischemia with prominent diffuse ischemic ST-T changes and acceptable preserved systolic function, 06/04/2013, with patient refusing further intervention with diagnostic coronary angiography 06/26/2013.  Echocardiogram showing an estimated EF of 0.45 to 0.50 with moderate tricuspid regurgitation and mild concentric left ventricular hypertrophy, 10/17/2014.   Non-ST elevation MI with left heart catheterization showing nonobstructive single vessel coronary artery disease, 50% ostial stenosis of the first diagonal branch, left anterior descending coronary artery, otherwise, no hemodynamically significant coronary artery disease and estimated EF of 0.35 with features of Takotsubo cardiomyopathy, 10/15/2014, by Dr. Marks.   Residual class I symptoms with acceptable echocardiogram, February 2016.  CCS class 2 chest pain/NYHA class II dyspnea December 2016  Left heart catheterization, 1/5/2017; no evidence of hemodynamically significant coronary artery disease, mild nonobstructive plaque in the LAD and first diagonal noted, LVEF 0.55, arterial hypertension, otherwise normal hemodynamics, medical therapy and risk factor management recommended  Left heart catheterization June 2022 showing mild CAD, normal EF, normal LV filling pressure  Echocardiogram 6/16/2022: LVEF 60%, RVSP less than 35 mmHg, aortic valve exhibits mild to moderate sclerosis, normal RV cavity size, wall thickness, systolic function and septal motion noted  [facility] ED visit for chest pain July 2023 with apparent unremarkable [id number] deficit  Recurrent chest pain at night May 2024  Residual class I symptoms, October 2019, May 2020, July 2021, February 2022, February 2023, November 2023  Intermittent tinnitus, AD, recurrent, October 2013.   Remote abnormal uterine bleeding with apparent  abnormal uterine biopsy with apparent uterine cancer, stage and type unknown-data deficit, summer 2005, with subsequent SERG/BSO with apparent negative lymph node sampling for well differentiated endometrial carcinoma, September 2005.   Chronic tobacco use with abnormal chest x-ray with discontinued tobacco use, autumn 2011.   GERD/gastritis:  Abnormal EGD, May 2013.   Initiation of Dexilant therapy, January 2013, with continuation after EGD, May 2013    No Known Allergies    Current Outpatient Medications   Medication Instructions    atorvastatin (LIPITOR) 20 mg, Oral, Daily    Beconase AQ 42 MCG/SPRAY nasal spray INSTILL ONE SPRAY IN EACH NOSTRIL ONCE DAILY AS NEEDED    bisoprolol (ZEBeta) 5 MG tablet TAKE 1/2 TABLET BY MOUTH IN THE MORNING    Blood Gluc Meter Disp-Strips device 1 Device, Does not apply, 4 Times Daily Before Meals & Nightly PRN    bumetanide (BUMEX) 1 MG tablet TAKE 1 TABLET ONCE DAILY    calcitriol (ROCALTROL) 0.25 MCG capsule 1 tablet, Oral, Daily    clopidogrel (PLAVIX) 75 mg, Oral, Daily    Diclofenac Sodium (VOLTAREN) 1 % gel gel APPLY FOUR grams TO AFFECTED AREA FOUR TIMES DAILY AS DIRECTED    Dulera 100-5 MCG/ACT inhaler INHALE TWO PUFFS BY MOUTH TWICE DAILY (RINSE MOUTH AFTER EACH USE)    empagliflozin (JARDIANCE) 10 mg, Oral, Daily    FREESTYLE LITE test strip 3 Times Daily, as directed    glipizide (GLUCOTROL XL) 5 mg, Oral, Daily    Lancets (freestyle) lancets 3 Times Daily, as directed    levothyroxine (SYNTHROID, LEVOTHROID) 150 mcg, Oral, Daily    metOLazone (ZAROXOLYN) 2.5 mg, Oral, Daily PRN    pantoprazole (PROTONIX) 40 MG EC tablet TAKE 1 TABLET BY MOUTH ONCE DAILY FOR heartburn    potassium chloride (K-DUR,KLOR-CON) 20 MEQ CR tablet TAKE 2 TABLETS ONCE DAILY FOR first two DAYS ONLY THEN TAKE 1 TABLET ONCE DAILY thereafter    Tradjenta 5 mg, Oral, Every Morning         HISTORY OF PRESENT ILLNESS:  The patient is here for 6-month follow-up.  In the past she had been inappropriately  using nitroglycerin sublingual whenever her blood pressure was low and says that it raises her blood pressure.  She was informed multiple times that it does the opposite of that and lowers her blood pressure even farther.   She wore a Holter monitor in February 2023 but did not turn it back in until August 2023 when she brought it back with her to her appointment. Holter monitor February 2023 showed PAC and PVC burden 9.4%, 116 beat episode of SVT. Carotid duplex August 2023 showed less than 50% bilateral carotid artery stenosis. 24-hour ambulatory blood pressure monitor results September 2023 showed there were a lot of error measurements but of the blood pressure readings that were recorded, the patient's average awake blood pressure was 121/54 mmHg, 85 bpm, average asleep blood pressure was 123/55 mmHg, 79 bpm, and average overall blood pressure was 121/54 mmHg, 84 bpm.  The patient thinks that she may have been taking the Bumex and the metolazone daily and was advised to only take metolazone sparingly.  She says her daughter takes her diuretics from her sometimes.  The patient says that she has had chest pain at night.  She does admit to sometimes having to eat later at night because of her glucose levels.  She says that the chest pain can be on both sides of her chest and sometimes in her abdomen.  She does not feel like it is GERD.  She also says that she has palpitations that she has noticed at night.  Her bisoprolol was decreased to 2.5 mg by her PCP and she has been taking this during the day.  I advised patient to take her bisoprolol at night.  If she has persistent palpitations despite this change, I will have her wear another Holter monitor.  When her blood pressure is around 115 systolic she has some lightheadedness but denies any presyncope or syncope.  Isosorbide was listed in her medications but upon calling her pharmacy, the patient has not picked up any prescriptions for this since 2022.      SOM  "  All other systems reviewed and otherwise negative.    Procedures       Objective:       Vitals:    05/15/24 1451   BP: 130/64   Pulse: 62   SpO2: 99%   Weight: 84.3 kg (185 lb 12.8 oz)   Height: 167.6 cm (66\")     Body mass index is 29.99 kg/m².  Wt Readings from Last 2 Encounters:   05/15/24 84.3 kg (185 lb 12.8 oz)   11/10/23 84.6 kg (186 lb 6.4 oz)        Constitutional:       Appearance: Healthy appearance. Not in distress.   Neck:      Vascular: Carotid bruit (R>L) present. No JVR. JVD normal.   Pulmonary:      Effort: Pulmonary effort is normal.      Breath sounds: Normal breath sounds. No wheezing. No rhonchi. No rales.   Chest:      Chest wall: Not tender to palpatation.   Cardiovascular:      PMI at left midclavicular line. Normal rate. Occasional ectopic beats. Irregular rhythm. Normal S1. Normal S2.       No gallop.  No click. No rub.   Pulses:     Intact distal pulses.   Edema:     Peripheral edema absent.   Abdominal:      General: Bowel sounds are normal.      Palpations: Abdomen is soft.      Tenderness: There is no abdominal tenderness.   Musculoskeletal: Normal range of motion.         General: No tenderness. Skin:     General: Skin is warm and dry.   Neurological:      General: No focal deficit present.      Mental Status: Alert and oriented to person, place and time.           Lab Review:   Lab Results   Component Value Date    GLUCOSE 162 (H) 08/04/2023    BUN 41 (H) 08/04/2023    CREATININE 1.57 (H) 08/04/2023    EGFRIFNONA 40 (L) 01/05/2017    BCR 26.1 (H) 08/04/2023    CO2 28.9 08/04/2023    CALCIUM 9.6 08/04/2023    ALBUMIN 4.0 08/04/2023    AST 14 08/04/2023    ALT 10 08/04/2023       Lab Results   Component Value Date    WBC 5.56 08/04/2023    HGB 13.9 08/04/2023    HCT 41.4 08/04/2023    MCV 88.1 08/04/2023     08/04/2023       Lab Results   Component Value Date    HGBA1C 7.10 (H) 08/04/2023       Lab Results   Component Value Date    TSH 0.451 08/04/2023       Lab Results "   Component Value Date    CHOL 150 06/15/2022    CHOL 154 01/05/2017     Lab Results   Component Value Date    TRIG 142 06/15/2022    TRIG 83 01/05/2017     Lab Results   Component Value Date    HDL 62 (H) 06/15/2022    HDL 69 (H) 01/05/2017     Lab Results   Component Value Date    LDL 64 06/15/2022    LDL 59 01/05/2017         Lab Results   Component Value Date     (H) 07/23/2014                 Results for orders placed during the hospital encounter of 06/15/22    Adult Transthoracic Echo Complete W/ Cont if Necessary Per Protocol    Interpretation Summary  · Estimated right ventricular systolic pressure from tricuspid regurgitation is normal (<35 mmHg).  · Estimated left ventricular EF = 60% Left ventricular ejection fraction appears to be 56 - 60%. Left ventricular systolic function is normal.  · Left ventricular diastolic function is consistent with age.  · Normal right ventricular cavity size, wall thickness, systolic function and septal motion noted.  · The aortic valve exhibits mild - moderate sclerosis.  · No evidence of pulmonary hypertension is present.  · There is no evidence of pericardial effusion.       Results for orders placed in visit on 08/04/23    Duplex Carotid Ultrasound CAR    Interpretation Summary    Right internal carotid artery demonstrates a less than 50% stenosis.    Left internal carotid artery demonstrates a less than 50% stenosis.    Left vertebral artery flow is antegrade    Right vertebral artery demonstrates early systolic deceleration.  No other signs of subclavian steal syndrome present.           Assessment:    The patient has had chest pain at night.  She also has had intermittent palpitations at night.  She will switch her bisoprolol to take it nightly and I will order cardiac PET to rule out focal obstructive CAD and an echocardiogram to assess heart structure/function.  If the patient continues to have palpitations after making the switch to taking bisoprolol at  night, I can mail her a new Holter monitor to wear.  Hopefully I will get to review the patient's next laboratory testing results.  She is supposed to have these labs drawn within the next couple weeks.  The patient will sometimes have some lower blood pressure readings at home around 115 systolic with lightheadedness.  She will monitor her blood pressure twice a day for the next week and call back with readings.  Depending on these readings, may consider reinitiating low-dose isosorbide.        Diagnosis Plan   1. Paroxysmal atrial fibrillation  Take bisoprolol nightly, if patient continues to have palpitations, would consider repeat Holter monitor      2. Essential hypertension   The patient will sometimes have some lower blood pressure readings at home around 115 systolic with lightheadedness.  She will monitor her blood pressure twice a day for the next week and call back with readings.      3. Hyperlipidemia LDL goal <70  No new labs to review, continue atorvastatin   4.  Precordial pain: Cardiac PET, echocardiogram          Plan:         Patient to continue current medications and close follow up with the above providers.  Tentative cardiology follow up in October 2024 or patient may return sooner PRN.  Cardiac PET  Echocardiogram      Electronically signed by SAMUEL Del Rio, 05/15/24, 4:01 PM EDT.

## 2024-05-15 ENCOUNTER — OFFICE VISIT (OUTPATIENT)
Dept: CARDIOLOGY | Facility: CLINIC | Age: 84
End: 2024-05-15
Payer: COMMERCIAL

## 2024-05-15 VITALS
OXYGEN SATURATION: 99 % | BODY MASS INDEX: 29.86 KG/M2 | WEIGHT: 185.8 LBS | DIASTOLIC BLOOD PRESSURE: 64 MMHG | SYSTOLIC BLOOD PRESSURE: 130 MMHG | HEIGHT: 66 IN | HEART RATE: 62 BPM

## 2024-05-15 DIAGNOSIS — E78.5 HYPERLIPIDEMIA LDL GOAL <70: ICD-10-CM

## 2024-05-15 DIAGNOSIS — R07.2 PRECORDIAL PAIN: ICD-10-CM

## 2024-05-15 DIAGNOSIS — I10 ESSENTIAL HYPERTENSION: ICD-10-CM

## 2024-05-15 DIAGNOSIS — I48.0 PAROXYSMAL ATRIAL FIBRILLATION: Primary | ICD-10-CM

## 2024-05-15 PROCEDURE — 99214 OFFICE O/P EST MOD 30 MIN: CPT | Performed by: NURSE PRACTITIONER

## 2024-09-16 RX ORDER — METOLAZONE 2.5 MG/1
TABLET ORAL
Qty: 30 TABLET | Refills: 6 | Status: SHIPPED | OUTPATIENT
Start: 2024-09-16

## (undated) DEVICE — MODEL BT2000 P/N 700287-012KIT CONTENTS: MANIFOLD WITH SALINE AND CONTRAST PORTS, SALINE TUBING WITH SPIKE AND HAND SYRINGE, TRANSDUCER: Brand: BT2000 AUTOMATED MANIFOLD KIT

## (undated) DEVICE — CATH DIAG EXPO .056 FL3.5 6F 100CM

## (undated) DEVICE — PINNACLE INTRODUCER SHEATH: Brand: PINNACLE

## (undated) DEVICE — PK CATH CARD 10

## (undated) DEVICE — A2000 MULTI-USE SYRINGE KIT, P/N 701277-003KIT CONTENTS: 100ML CONTRAST RESERVOIR AND TUBING WITH CONTRAST SPIKE AND CLAMP: Brand: A2000 MULTI-USE SYRINGE KIT

## (undated) DEVICE — CATH DIAG EXPO M/ PK 5F FL4/FR4 PIG

## (undated) DEVICE — GW PERIPH GUIDERIGHT STD/EXCHNG/J/TIP SS 0.035IN 5X260CM

## (undated) DEVICE — CATH DIAG EXPO .045 FL3  5F 100CM

## (undated) DEVICE — RADIFOCUS GLIDEWIRE: Brand: GLIDEWIRE

## (undated) DEVICE — GW INQWIRE FC PTFE STD J/1.5 .035 260

## (undated) DEVICE — CATH DIAG EXPO M/ PK 6FR FL4/FR4 PIG 3PK

## (undated) DEVICE — TR BAND RADIAL ARTERY COMPRESSION DEVICE: Brand: TR BAND

## (undated) DEVICE — MODEL AT P65, P/N 701554-001KIT CONTENTS: HAND CONTROLLER, 3-WAY HIGH-PRESSURE STOPCOCK WITH ROTATING END AND PREMIUM HIGH-PRESSURE TUBING: Brand: ANGIOTOUCH® KIT

## (undated) DEVICE — GLIDESHEATH SLENDER STAINLESS STEEL KIT: Brand: GLIDESHEATH SLENDER

## (undated) DEVICE — Device: Brand: PROWATER

## (undated) DEVICE — DRSNG SURESITE123 4X4.8IN

## (undated) DEVICE — ST ACC MICROPUNCTURE .018 TRANSLSS/SS/TP 5F/10CM 21G/7CM

## (undated) DEVICE — ANGIO-SEAL EVOLUTION VASCULAR CLOSURE DEVICE: Brand: ANGIO-SEAL

## (undated) DEVICE — MODEL AT P54, P/N 700608-035KIT CONTENTS: HAND CONTROLLER, 3-WAY HIGH-PRESSURE STOPCOCK WITH ROTATING END AND PREMIUM HIGH-PRESSURE TUBING: Brand: ANGIOTOUCH® KIT

## (undated) DEVICE — GW J TP FIX CORE .035 150

## (undated) DEVICE — GLIDESHEATH BASIC HYDROPHILIC COATED INTRODUCER SHEATH: Brand: GLIDESHEATH

## (undated) DEVICE — DEV COMP RAD PRELUDESYNC 24CM

## (undated) DEVICE — GW PERIPH VASC ADX J/TP SS .035 150CM 3MM